# Patient Record
Sex: FEMALE | Race: WHITE | HISPANIC OR LATINO | Employment: UNEMPLOYED | URBAN - METROPOLITAN AREA
[De-identification: names, ages, dates, MRNs, and addresses within clinical notes are randomized per-mention and may not be internally consistent; named-entity substitution may affect disease eponyms.]

---
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
## 2022-03-27 ENCOUNTER — NURSE TRIAGE (OUTPATIENT)
Dept: NURSING | Facility: CLINIC | Age: 22
End: 2022-03-27
Payer: COMMERCIAL

## 2022-03-27 ENCOUNTER — APPOINTMENT (OUTPATIENT)
Dept: CT IMAGING | Facility: HOSPITAL | Age: 22
End: 2022-03-27
Attending: EMERGENCY MEDICINE
Payer: MEDICAID

## 2022-03-27 ENCOUNTER — HOSPITAL ENCOUNTER (EMERGENCY)
Facility: HOSPITAL | Age: 22
Discharge: HOME | End: 2022-03-27
Attending: EMERGENCY MEDICINE | Admitting: EMERGENCY MEDICINE
Payer: MEDICAID

## 2022-03-27 VITALS
OXYGEN SATURATION: 98 % | RESPIRATION RATE: 16 BRPM | TEMPERATURE: 97.9 F | BODY MASS INDEX: 30.6 KG/M2 | WEIGHT: 189.6 LBS | SYSTOLIC BLOOD PRESSURE: 111 MMHG | HEART RATE: 106 BPM | DIASTOLIC BLOOD PRESSURE: 61 MMHG

## 2022-03-27 DIAGNOSIS — R51.9 RIGHT-SIDED HEADACHE: ICD-10-CM

## 2022-03-27 DIAGNOSIS — M25.562 ACUTE PAIN OF LEFT KNEE: ICD-10-CM

## 2022-03-27 LAB
HCG UR QL: NEGATIVE
INTERNAL QC OK POCT: NORMAL
POCT KIT EXPIRATION DATE: NORMAL
POCT KIT LOT NUMBER: NORMAL

## 2022-03-27 PROCEDURE — 96361 HYDRATE IV INFUSION ADD-ON: CPT

## 2022-03-27 PROCEDURE — 70450 CT HEAD/BRAIN W/O DYE: CPT

## 2022-03-27 PROCEDURE — 96375 TX/PRO/DX INJ NEW DRUG ADDON: CPT

## 2022-03-27 PROCEDURE — 258N000003 HC RX IP 258 OP 636: Performed by: EMERGENCY MEDICINE

## 2022-03-27 PROCEDURE — 81025 URINE PREGNANCY TEST: CPT | Performed by: EMERGENCY MEDICINE

## 2022-03-27 PROCEDURE — 250N000011 HC RX IP 250 OP 636: Performed by: EMERGENCY MEDICINE

## 2022-03-27 PROCEDURE — 96374 THER/PROPH/DIAG INJ IV PUSH: CPT

## 2022-03-27 PROCEDURE — 99284 EMERGENCY DEPT VISIT MOD MDM: CPT | Mod: 25

## 2022-03-27 PROCEDURE — 250N000013 HC RX MED GY IP 250 OP 250 PS 637: Performed by: EMERGENCY MEDICINE

## 2022-03-27 RX ORDER — DEXAMETHASONE SODIUM PHOSPHATE 10 MG/ML
10 INJECTION, SOLUTION INTRAMUSCULAR; INTRAVENOUS ONCE
Status: COMPLETED | OUTPATIENT
Start: 2022-03-27 | End: 2022-03-27

## 2022-03-27 RX ORDER — ACETAMINOPHEN 325 MG/1
650 TABLET ORAL ONCE
Status: COMPLETED | OUTPATIENT
Start: 2022-03-27 | End: 2022-03-27

## 2022-03-27 RX ORDER — METOCLOPRAMIDE HYDROCHLORIDE 5 MG/ML
10 INJECTION INTRAMUSCULAR; INTRAVENOUS ONCE
Status: COMPLETED | OUTPATIENT
Start: 2022-03-27 | End: 2022-03-27

## 2022-03-27 RX ORDER — KETOROLAC TROMETHAMINE 30 MG/ML
15 INJECTION, SOLUTION INTRAMUSCULAR; INTRAVENOUS ONCE
Status: COMPLETED | OUTPATIENT
Start: 2022-03-27 | End: 2022-03-27

## 2022-03-27 RX ORDER — DIPHENHYDRAMINE HYDROCHLORIDE 50 MG/ML
25 INJECTION INTRAMUSCULAR; INTRAVENOUS ONCE
Status: COMPLETED | OUTPATIENT
Start: 2022-03-27 | End: 2022-03-27

## 2022-03-27 RX ORDER — DEXAMETHASONE SODIUM PHOSPHATE 10 MG/ML
8 INJECTION, SOLUTION INTRAMUSCULAR; INTRAVENOUS ONCE
Status: DISCONTINUED | OUTPATIENT
Start: 2022-03-27 | End: 2022-03-27

## 2022-03-27 RX ADMIN — KETOROLAC TROMETHAMINE 15 MG: 30 INJECTION, SOLUTION INTRAMUSCULAR at 17:04

## 2022-03-27 RX ADMIN — SODIUM CHLORIDE 1000 ML: 9 INJECTION, SOLUTION INTRAVENOUS at 16:59

## 2022-03-27 RX ADMIN — DIPHENHYDRAMINE HYDROCHLORIDE 25 MG: 50 INJECTION, SOLUTION INTRAMUSCULAR; INTRAVENOUS at 17:08

## 2022-03-27 RX ADMIN — ACETAMINOPHEN 650 MG: 325 TABLET ORAL at 18:20

## 2022-03-27 RX ADMIN — DEXAMETHASONE SODIUM PHOSPHATE 10 MG: 10 INJECTION INTRAMUSCULAR; INTRAVENOUS at 18:18

## 2022-03-27 RX ADMIN — METOCLOPRAMIDE HYDROCHLORIDE 10 MG: 5 INJECTION INTRAMUSCULAR; INTRAVENOUS at 18:00

## 2022-03-27 ASSESSMENT — ENCOUNTER SYMPTOMS
HEADACHES: 1
NAUSEA: 1
DIZZINESS: 1

## 2022-03-27 NOTE — ED PROVIDER NOTES
EMERGENCY DEPARTMENT ENCOUNTER      NAME: Laura Barrera  AGE: 21 year old female  YOB: 2000  MRN: [id number]  EVALUATION DATE & TIME: No admission date for patient encounter.    PCP: No Ref-Primary, Physician    ED PROVIDER: Noe Claire M.D.      Chief Complaint   Patient presents with     Headache         FINAL IMPRESSION:  No diagnosis found.      ED COURSE & MEDICAL DECISION MAKING:    Pertinent Labs & Imaging studies reviewed. (See chart for details)  ED Course as of 04/20/22 1400   Sun Mar 27, 2022   1432 Patient is a 21-year-old woman who presents with right-sided headaches that become more persistent and daily for the past month.  She has blurred vision in the right eye as well as dizziness during these episodes.  She has been seen in the ER couple of times this past week with temporary improvement with Toradol.  On exam here she has 9 out of 10 pressure pain in the right side of her head, pattern is 100% typical for either tension or migraine, but has components of both.  Because her pain pattern worsening pain get a CT of the head.  I would prefer an MRI to rule out demyelinating lesion, however she has implanted metal in her body and not MRI compatible.  If CT scan is negative then we will have her follow-up with neurology as an outpatient.  In the meantime give IV migraine cocktail.   1502 CT scan and IV analgesia are still pending.  Patient is in the waiting room awaiting a bed.  Patient's care will be signed out to the oncoming ED physician Dr. Burt.  If CT scan is unremarkable, plan to discharge patient.  I have already set up the discharge paperwork with primary care information, an urgent referral was also placed.  I also gave her contact information for the neurology clinic and a return to work note for tomorrow.         Additional ED Course Timestamps:  2:15 PM Initial history and physical performed. Plan of care discussed. PPE utilized includes N95 mask, face shield, and  gloves.     At the conclusion of the encounter I discussed the results of all of the tests and the disposition. The questions were answered. The patient or family acknowledged understanding and was agreeable with the care plan.         MEDICATIONS GIVEN IN THE EMERGENCY:  Medications   0.9% sodium chloride BOLUS (has no administration in time range)   metoclopramide (REGLAN) injection 10 mg (has no administration in time range)   diphenhydrAMINE (BENADRYL) injection 25 mg (has no administration in time range)   ketorolac (TORADOL) injection 15 mg (has no administration in time range)         NEW PRESCRIPTIONS STARTED AT TODAY'S ER VISIT  New Prescriptions    No medications on file          =================================================================    HPI    Patient information was obtained from: Patient     Use of : N/A       Laura Barrera is a 21 year old female with no pertinent history who presents to this ED for evaluation of headaches.    Patient reports that she has been having intermittent right sided migraine headaches for about 4 weeks. Patient says that in the past week the frequency of the migraines has increased and she has gotten them every day. Patient describes her current as a 9/10 constant head pressure. Patient endorses associated blurry vision, dizziness, and nausea. Patient reports that she sometimes needs assistance to ambulate because of her dizziness and blurred vision. Patient is unsure of specific triggers but reports that this morning after waking up she was looking out the window. Patient noticed it was bright and then her temple felt like it was getting hot and then there was a shooting right sided head pain. Patient has tried taking ibuprofen and tylenol with no relief. Patient has a cyst on the right side of her head that fluctuates in size. The patient is concerned this cyst could be causing her headaches and would like to have some imaging done. Patient denies chance  of pregnancy or any other concerns at this time.          REVIEW OF SYSTEMS   Review of Systems   Eyes: Positive for visual disturbance.   Gastrointestinal: Positive for nausea.   Neurological: Positive for dizziness and headaches.   All other systems reviewed and are negative.       PAST MEDICAL HISTORY:  History reviewed. No pertinent past medical history.    PAST SURGICAL HISTORY:  History reviewed. No pertinent surgical history.        CURRENT MEDICATIONS:    Current Facility-Administered Medications   Medication     0.9% sodium chloride BOLUS     diphenhydrAMINE (BENADRYL) injection 25 mg     ketorolac (TORADOL) injection 15 mg     metoclopramide (REGLAN) injection 10 mg     Current Outpatient Medications   Medication     order for DME       ALLERGIES:  No Known Allergies    FAMILY HISTORY:  Family History   Problem Relation Age of Onset     Cancer Mother        SOCIAL HISTORY:   Social History     Socioeconomic History     Marital status: Single     Spouse name: None     Number of children: None     Years of education: None     Highest education level: None   Occupational History     None   Tobacco Use     Smoking status: Never Smoker     Smokeless tobacco: Never Used   Substance and Sexual Activity     Alcohol use: None     Drug use: None     Sexual activity: None   Other Topics Concern     None   Social History Narrative     None     Social Determinants of Health     Financial Resource Strain: Not on file   Food Insecurity: Not on file   Transportation Needs: Not on file   Physical Activity: Not on file   Stress: Not on file   Social Connections: Not on file   Intimate Partner Violence: Not on file   Housing Stability: Not on file       VITALS:  /76   Pulse 96   Temp 97.9  F (36.6  C) (Temporal)   Resp 12   Wt 86 kg (189 lb 9.5 oz)   SpO2 97%   BMI 30.60 kg/m      PHYSICAL EXAM    Constitutional: Well developed, well nourished. Comfortable appearing.  HENT: Normocephalic, atraumatic, mucous  membranes moist, nose normal. Neck- Supple, gross ROM intact. Small subcutaneous cyst to right parietal scalp that is tender to touch.  Eyes: Pupils mid-range, conjunctiva without injection, no discharge.   Respiratory: Clear to auscultation bilaterally, no respiratory distress, no wheezing, speaks full sentences easily. No cough.  Cardiovascular: Normal heart rate, regular rhythm, no murmurs. No pedal edema, 2+ DP pulses.   GI: Soft, no tenderness to deep palpation in all quadrants, no masses.  Musculoskeletal: Moving all 4 extremities intentionally and without pain. No obvious deformity.  Skin: Warm, dry, no rash.  Neurologic: Alert & oriented x 3, cranial nerves grossly intact.  Psychiatric: Affect normal, cooperative.       LAB:  All pertinent labs reviewed and interpreted.  Labs Ordered and Resulted from Time of ED Arrival to Time of ED Departure - No data to display    RADIOLOGY:  Reviewed all pertinent imaging. Please see official radiology report.  MR Brain w/o & w Contrast    (Results Pending)       EKG:    All EKG interpretations will be found in ED course above.    PROCEDURES:   none      I, Josh Browne am serving as a scribe to document services personally performed by Dr. Noe Claire based on my observation and the provider's statements to me. INoe MD attest that Josh Browne is acting in a scribe capacity, has observed my performance of the services and has documented them in accordance with my direction.    Noe Claire M.D.  Emergency Medicine  [facility] EMERGENCY DEPARTMENT  [facility] [facility] [id number] [phone]  Dept: [phone]      Noe Claire MD  04/20/22 1670

## 2022-03-27 NOTE — Clinical Note
Laura Barrera was seen and treated in our emergency department on 3/27/2022.  She may return to work on 03/28/2022.       If you have any questions or concerns, please don't hesitate to call.      Noe Claire MD

## 2022-03-27 NOTE — TELEPHONE ENCOUNTER
"Patient's brother calling concerned that his sister has had bump on R side of forehead and is having severe migraines. Pt is not with the caller, so asked to do a 3-way call with pt. Once Laura was on the phone, proceeded with questions.    She says she has had the bump on her head since 8th grade. Does not ever recall any injury to her head. It will intermittently swell and cause severe headaches. She states that the bump itself hurts, too. Currently it is the size of a BB. Has been seen in an C (last on 3/22) for the headaches/pain and was given toradol. Has been missing work due to pain. Also has had light sensitivity. Rates pain in R eye 6/10, Headache pain now 9/10. Last took tylenol at 11pm yesterday evening.   Protocol recommends going to the ED now. Care advice reviewed with the pt to take tylenol and motrin for the pain. Pt verbalized understanding.    Kiersten Wu RN, BSN  [facility]   Triage Nurse Advisor      Reason for Disposition    [1] SEVERE headache (e.g., excruciating) AND [2] \"worst headache\" of life    Additional Information    Negative: Difficult to awaken or acting confused (e.g., disoriented, slurred speech)    Negative: [1] Weakness of the face, arm or leg on one side of the body AND [2] new onset    Negative: [1] Numbness of the face, arm or leg on one side of the body AND [2] new onset    Negative: [1] Loss of speech or garbled speech AND [2] new onset    Negative: Passed out (i.e., lost consciousness, collapsed and was not responding)    Negative: Sounds like a life-threatening emergency to the triager    Negative: Followed a head injury    Negative: Pregnant    Negative: Postpartum (from 0 to 6 weeks after delivery)    Negative: Traumatic Brain Injury (TBI) is suspected    Negative: Unable to walk, or can only walk with assistance (e.g., requires support)    Negative: Stiff neck (can't touch chin to chest)    Negative: Severe pain in one eye    Negative: [1] Other family members " (or roommates) with headaches AND [2] possibility of carbon monoxide exposure    Protocols used: HEADACHE-A-AH

## 2022-03-27 NOTE — DISCHARGE INSTRUCTIONS
Thankfully the CT scan did not show any dangerous signs of tumor, mass, or bleeding.  Because you have frequent headaches that are disabling it is important that you call neurology on Monday to set up an appointment.  I also placed a referral to set up primary care.  Do not get a call on Monday I recommend calling [facility], they can usually get people seen by the end of the week.  Can help with prescription medications to help at home preventing headaches and treating headaches as they arise

## 2022-03-27 NOTE — ED PROVIDER NOTES
EMERGENCY DEPARTMENT ENCOUNTER      NAME: Laura Barrera  AGE: 21 year old female  YOB: 2000  MRN: [id number]  EVALUATION DATE & TIME: No admission date for patient encounter.    PCP: No Ref-Primary, Physician    ED PROVIDER: Flor Burt M.D.        Chief Complaint   Patient presents with     Headache         FINAL IMPRESSION:    1. Right-sided headache    2. Acute pain of left knee          Patient was signed out to me at change of shift by Dr. Claire at 3:33 PM. Please see their note for full HPI, exam and plan.    MEDICAL DECISION MAKIN year old female presents emergency department intermittent headaches now for about 4 weeks.  Head CT negative.  Patient signed out at change of shift awaiting head CT results and response to medications.  Ultimately patient was treated with IV fluids, Benadryl, and Toradol without much relief.  Tylenol, Decadron, Reglan, and then oxygen therapy 5 L nasal cannula were added and patient experienced complete resolution of headache.  She will be discharged home with .      ED COURSE:  3:33 PM  Patient was signed out to me at change of shift by Dr. Claire. Please see their note for full HPI, exam and plan.    5:00PM  Brifely introduced myself to patient and asked for pt to go to CT before meds so that once meds given she can just rest in a dark and quiet room. She understands.    6:03 PM  Recheck on patient.  Headache is now down to a 9/10.  Neuro exam grossly unremarkable.  Head CT negative.  She has received the Toradol, IV fluids, and Benadryl but had not yet received the Reglan.  Nursing is giving it now.  We will add on some Decadron, Tylenol, and oxygen therapy to see if this helps as well.  No red flags on history or exam.    7:30 PM  Rechecked on patient. She reports that her headache is now a 0/10 and says she is ready to go home.  She feels like the oxygen therapy helps the most when she was placed on 5 L nasal cannula oxygen.   She was not requiring oxygen from a respiratory standpoint, this was refused to help with the headache and she thinks this was the most helpful.  Headache resolved almost immediately after this was placed.  She will be discharged home with .    At this time I do not think that the headache represents CVA, TIA, SAH, glaucoma, temporal arteritis, sinus thrombosis, vascular dissection, pseudotumor cerebri, meningitis, enchephalitis, hypertensive urgency or emergency, or other such etiologies.      COVID-19 PPE worn during patient evaluation:  Mask: n95 and homemade masks   Eye Protection: goggles   Gown: none  Hair cover: yes  Face shield: none  Patient wearing a mask: yes      At the conclusion of the encounter I discussed the results of all of the tests and the disposition. Their questions were answered. The patient (and any family present) acknowledged understanding and were agreeable with the care plan.      CONSULTANTS:  None        MEDICATIONS GIVEN IN THE EMERGENCY:  Medications   0.9% sodium chloride BOLUS (0 mLs Intravenous Stopped 3/27/22 1800)   metoclopramide (REGLAN) injection 10 mg (10 mg Intravenous Given 3/27/22 1800)   diphenhydrAMINE (BENADRYL) injection 25 mg (25 mg Intravenous Given 3/27/22 1708)   ketorolac (TORADOL) injection 15 mg (15 mg Intravenous Given 3/27/22 1704)   acetaminophen (TYLENOL) tablet 650 mg (650 mg Oral Given 3/27/22 1820)   dexamethasone PF (DECADRON) injection 10 mg (10 mg Intravenous Given 3/27/22 1818)         NEW PRESCRIPTIONS STARTED AT TODAY'S ER VISIT     Medication List      There are no discharge medications for this visit.           CONDITION:  Stable, improved        DISPOSITION:  discharge home         =================================================================  =================================================================      Patient was signed out to me at change of shift by Dr. Claire at 3:33 PM. Please see their note for full HPI, exam and  plan.        HPI    Laura Barrera is a 21 year old female presents to the ER with complaints of intermittent right-sided migraine type headache now for about 4 weeks.  Frequency has been increasing and she is getting them nearly every day.  She also endorses some blurry vision, dizziness, and some nausea.  Seen by my partner and CT head ordered to be sure there is no new mass or etiology and medications for symptom control.  Ultimate plan is to follow-up with neurology and establish care with primary care if CT scan is negative and headache under control.      PAST MEDICAL HISTORY:  Past Medical History:   Diagnosis Date     Scoliosis          PAST SURGICAL HISTORY:  Past Surgical History:   Procedure Laterality Date     CHOLECYSTOSTOMY  2017     SPINE SURGERY  2013    hx scoliosis         CURRENT MEDICATIONS:    Prior to Admission medications    Medication Sig Start Date End Date Taking? Authorizing Provider   order for DME Equipment being ordered: dorothea xie LT, MD  Patient not taking: Reported on 11/9/2019 7/26/19   Yeo, Albert, MD         ALLERGIES:  No Known Allergies      FAMILY HISTORY:  Family History   Problem Relation Age of Onset     Cancer Mother          SOCIAL HISTORY:  Social History     Socioeconomic History     Marital status: Single     Spouse name: None     Number of children: None     Years of education: None     Highest education level: None   Occupational History     None   Tobacco Use     Smoking status: Never Smoker     Smokeless tobacco: Never Used   Substance and Sexual Activity     Alcohol use: None     Drug use: None     Sexual activity: None   Other Topics Concern     None   Social History Narrative     None     Social Determinants of Health     Financial Resource Strain: Not on file   Food Insecurity: Not on file   Transportation Needs: Not on file   Physical Activity: Not on file   Stress: Not on file   Social Connections: Not on file   Intimate Partner Violence: Not on file    Housing Stability: Not on file         VITALS:  Patient Vitals for the past 24 hrs:   BP Temp Temp src Pulse Resp SpO2 Weight   03/27/22 1930 113/67 -- -- 106 -- 99 % --   03/27/22 1915 107/59 -- -- 85 -- 99 % --   03/27/22 1900 104/56 -- -- 81 -- 100 % --   03/27/22 1845 115/57 -- -- 80 -- 100 % --   03/27/22 1844 -- -- -- 81 -- 100 % --   03/27/22 1830 121/62 -- -- 82 -- 100 % --   03/27/22 1817 119/67 -- -- 91 16 100 % --   03/27/22 1815 -- -- -- 93 -- 100 % --   03/27/22 1715 118/58 -- -- 78 -- 100 % --   03/27/22 1700 121/60 -- -- 81 -- 99 % --   03/27/22 1400 127/76 97.9  F (36.6  C) Temporal 96 12 97 % 86 kg (189 lb 9.5 oz)       Wt Readings from Last 3 Encounters:   03/27/22 86 kg (189 lb 9.5 oz) (96 %, Z= 1.77)*   11/09/19 80.6 kg (177 lb 9.6 oz) (95 %, Z= 1.66)*   09/16/19 81.2 kg (179 lb) (95 %, Z= 1.69)*     * Growth percentiles are based on CDC (Girls, 2-20 Years) data.         PHYSICAL EXAM    Please see initial providers note for detailed physical exam        LAB:  All pertinent labs reviewed and interpreted.  Recent Results (from the past 24 hour(s))   HCG qualitative urine POCT    Collection Time: 03/27/22  4:27 PM   Result Value Ref Range    HCG Qual Urine Negative Negative    Internal QC Check POCT Valid Valid    POCT Kit Lot Number [id number]     POCT Kit Expiration Date 3-        No results found for: ABORH        RADIOLOGY:  Reviewed all pertinent imaging. Please see official radiology report.    Head CT w/o contrast   Final Result   IMPRESSION:     1.  No evidence of acute intracranial hemorrhage or mass effect.            EKG:    none    PROCEDURES:  none      I, Raisa Fransisco, am serving as a scribe to document services personally performed by Dr. Flor Burt based on my observation and the provider's statements to me. I, Dr. Flor Burt MD attest that Raisa Moody is acting in a scribe capacity, has observed my performance of the services and has documented them in  accordance with my direction.        Flor Burt M.D. [facility]  Emergency Medicine and Medical Toxicology  Formerly [facility] EMERGENCY DEPARTMENT  [facility] [facility] [facility] [id number] [phone]  Dept: [phone]         Flor Burt MD  03/27/22 1940

## 2022-03-27 NOTE — ED TRIAGE NOTES
She started notice a lump on the right side of her head. It has been there for years. But recently she has had headaches she attributes to the lump

## 2022-03-27 NOTE — ED NOTES
"He fibashir states they have gone to a number of providers who they say \"are not willing to look into it any further.\" When I asked what that means he states he and the patient are wanting imaging of her brain. I provided some education that if they can feel the bump on the outside of the bone on the their head there is likely no relation to a brain injury that would require imaging since the lump has been there for years. I advised them the lump and the headache may have nothing to do with each other.  "

## 2022-03-27 NOTE — Clinical Note
Laura Barrera was seen and treated in our emergency department on 3/27/2022.  She may return to work on 03/29/2022.       If you have any questions or concerns, please don't hesitate to call.      Flor Burt MD

## 2022-03-27 NOTE — Clinical Note
Bruce was seen and treated in our emergency department on 3/27/2022.  She may return to school on 03/29/2022.      If you have any questions or concerns, please don't hesitate to call.      Flor Burt MD

## 2022-03-28 ENCOUNTER — TELEPHONE (OUTPATIENT)
Dept: NEUROLOGY | Facility: CLINIC | Age: 22
End: 2022-03-28
Payer: COMMERCIAL

## 2022-03-28 NOTE — TELEPHONE ENCOUNTER
[facility]    Phone Message    May a detailed message be left on voicemail: yes     Reason for Call: Appointment Intake    Referring Provider Name: Flor Josue  Diagnosis and/or Symptoms: Right-sided headache /Headache/Migraine    Per dr. Burt please schedule patient with in 1-2 days, patient is schedule on 7/21/22 with Dr. Us, patient was added to waitlist    Action Taken: Other: [facility]    Travel Screening: Not Applicable

## 2022-03-29 ENCOUNTER — NURSE TRIAGE (OUTPATIENT)
Dept: NURSING | Facility: CLINIC | Age: 22
End: 2022-03-29
Payer: COMMERCIAL

## 2022-03-29 NOTE — TELEPHONE ENCOUNTER
Triage Call:    Patient is calling and for the past 3-4 weeks has been having bad headaches possibly caused by cysts on her head.  A few days ago she was in the ED due to a bad headache due to pain control.  What worked the best she said was getting oxygen via mask and she was on it for about 1.5 hours and the pain was improved with that.  She was told to call the Urgent Care to see if they would be able to do this vs coming into the ED if she needed it again and she reports the pain level is the same and she is wanting that again for pain control.   Advised that the UC will not use O2 as a treatment, as they only use it for emergency uses.      Reviewed chart and provider documented that O2 was effective, along with Reglan and decadron.     Pt was advised of protocol recommendation/disposition of follow-up with ED providers, as patient does not have established PCP within [facility] to address medication/oxygen requested need.     Flor Cameron RN on 3/29/2022 at 5:46 PM        COVID 19 Nurse Triage Plan/Patient Instructions    Please be aware that novel coronavirus (COVID-19) may be circulating in the community. If you develop symptoms such as fever, cough, or SOB or if you have concerns about the presence of another infection including coronavirus (COVID-19), please contact your health care provider or visit [email or web address].     Disposition/Instructions    In-Person Visit with provider recommended. Reference Visit Selection Guide.    Thank you for taking steps to prevent the spread of this virus.  o Limit your contact with others.  o Wear a simple mask to cover your cough.  o Wash your hands well and often.    Resources    [facility]: About COVID-19: [email or web address]    CDC: What to Do If You're Sick: www.cdc.gov/coronavirus/2019-ncov/about/steps-when-sick.html    CDC: Ending Home Isolation: www.cdc.gov/coronavirus/2019-ncov/hcp/disposition-in-home-patients.html     CDC: Caring for  Someone: www.cdc.gov/coronavirus/2019-ncov/if-you-are-sick/care-for-someone.html     [facility]: Interim Guidance for Hospital Discharge to Home: www.health.[facility].[place]/diseases/coronavirus/hcp/hospdischarge.pdf    [facility] clinical trials (COVID-19 research studies): clinicalaffairs.[facility]/umn-clinical-trials     Below are the COVID-19 hotlines at the [place] Department of Health ([facility]). Interpreters are available.   o For health questions: Call 364-979-0863 or 1-630.938.4729 (7 a.m. to 7 p.m.)  o For questions about schools and childcare: Call 424-032-8017 or 1-778.674.3759 (7 a.m. to 7 p.m.)                   Reason for Disposition    [1] Caller has URGENT medication question about med that PCP or specialist prescribed AND [2] triager unable to answer question    Additional Information    Negative: Drug overdose and triager unable to answer question    Negative: Caller requesting information unrelated to medicine    Negative: Caller requesting a prescription for Strep throat and has a positive culture result    Negative: Rash while taking a medication or within 3 days of stopping it    Negative: Immunization reaction suspected    Negative: [1] Asthma and [2] having symptoms of asthma (cough, wheezing, etc.)    Negative: [1] Influenza symptoms AND [2] anti-viral med prescription request, such as Tamiflu    Negative: [1] Symptom of illness (e.g., headache, abdominal pain, earache, vomiting) AND [2] more than mild    Negative: MORE THAN A DOUBLE DOSE of a prescription or over-the-counter (OTC) drug    Negative: [1] DOUBLE DOSE (an extra dose or lesser amount) of over-the-counter (OTC) drug AND [2] any symptoms (e.g., dizziness, nausea, pain, sleepiness)    Negative: [1] DOUBLE DOSE (an extra dose or lesser amount) of prescription drug AND [2] any symptoms (e.g., dizziness, nausea, pain, sleepiness)    Negative: Took another person's prescription drug    Negative: [1] DOUBLE DOSE (an extra dose or lesser  "amount) of prescription drug AND [2] NO symptoms (Exception: a double dose of antibiotics)    Negative: Diabetes drug error or overdose (e.g., took wrong type of insulin or took extra dose)    Negative: [1] Request for URGENT new prescription or refill of \"essential\" medication (i.e., likelihood of harm to patient if not taken) AND [2] triager unable to fill per unit policy    Negative: [1] Prescription not at pharmacy AND [2] was prescribed by PCP recently    Negative: [1] Pharmacy calling with prescription questions AND [2] triager unable to answer question    Protocols used: MEDICATION QUESTION CALL-A-AH      "

## 2022-03-31 ENCOUNTER — OFFICE VISIT (OUTPATIENT)
Dept: NEUROLOGY | Facility: CLINIC | Age: 22
End: 2022-03-31
Payer: MEDICAID

## 2022-03-31 VITALS — DIASTOLIC BLOOD PRESSURE: 106 MMHG | SYSTOLIC BLOOD PRESSURE: 149 MMHG | HEART RATE: 137 BPM

## 2022-03-31 DIAGNOSIS — G43.009 MIGRAINE WITHOUT AURA AND WITHOUT STATUS MIGRAINOSUS, NOT INTRACTABLE: ICD-10-CM

## 2022-03-31 DIAGNOSIS — R51.9 RIGHT-SIDED HEADACHE: ICD-10-CM

## 2022-03-31 PROBLEM — M25.562 ACUTE PAIN OF LEFT KNEE: Status: RESOLVED | Noted: 2019-09-21 | Resolved: 2022-03-31

## 2022-03-31 PROCEDURE — 99205 OFFICE O/P NEW HI 60 MIN: CPT | Performed by: PSYCHIATRY & NEUROLOGY

## 2022-03-31 RX ORDER — RIZATRIPTAN BENZOATE 10 MG/1
10 TABLET, ORALLY DISINTEGRATING ORAL
Qty: 18 TABLET | Refills: 3 | Status: SHIPPED | OUTPATIENT
Start: 2022-03-31 | End: 2022-03-31

## 2022-03-31 RX ORDER — RIZATRIPTAN BENZOATE 10 MG/1
10 TABLET, ORALLY DISINTEGRATING ORAL
Qty: 18 TABLET | Refills: 3 | Status: SHIPPED | OUTPATIENT
Start: 2022-03-31 | End: 2023-04-04

## 2022-03-31 RX ORDER — NORTRIPTYLINE HYDROCHLORIDE 50 MG/1
50 CAPSULE ORAL AT BEDTIME
Qty: 30 CAPSULE | Refills: 6 | Status: SHIPPED | OUTPATIENT
Start: 2022-04-30 | End: 2022-07-26

## 2022-03-31 RX ORDER — NORTRIPTYLINE HCL 25 MG
CAPSULE ORAL
Qty: 60 CAPSULE | Refills: 6 | Status: SHIPPED | OUTPATIENT
Start: 2022-03-31 | End: 2022-03-31

## 2022-03-31 RX ORDER — NORTRIPTYLINE HYDROCHLORIDE 50 MG/1
50 CAPSULE ORAL AT BEDTIME
Qty: 30 CAPSULE | Refills: 6 | Status: SHIPPED | OUTPATIENT
Start: 2022-04-30 | End: 2022-03-31

## 2022-03-31 RX ORDER — NORTRIPTYLINE HCL 25 MG
CAPSULE ORAL
Qty: 60 CAPSULE | Refills: 6 | Status: SHIPPED | OUTPATIENT
Start: 2022-03-31 | End: 2022-07-26

## 2022-03-31 NOTE — LETTER
3/31/2022         RE: Laura Barrera  [id number] [address] [id number]        Dear Colleague,    Thank you for referring your patient, Laura Barrera, to the [facility] [place]. Please see a copy of my visit note below.    NEUROLOGY CONSULTATION NOTE       [facility] [place]  [address] [place] [address]  Tel: [phone]  Fax: [phone]  [email or web address]     Laura Barrera,  2000, MRN [id number]  PCP: No Ref-Primary, Physician  Date: 2022     ASSESSMENT & PLAN     Visit Diagnosis  1. Right-sided headache     Migraine headache without aura  21-year-old female with history of scoliosis status post kiley placement who was referred for evaluation of progressively worsening headaches.  Her description sounds typical for migraine without aura but on funduscopic exam on the left margins are as sharp and I could not appreciate any venous pulsations bilaterally.  I recommended MRI scan but patient needs to find out from her surgeon if her rods are compatible with MRI and in the meantime I have started her on nortriptyline 25 mg at bedtime increasing it to 50 mg at bedtime.  For abortive treatment she will use Maxalt MLT.  She will call us once she finds out if her metal rods are compatible with MRI to schedule her for a brain MRI with and without contrast.  Follow-up will be in 3 months.    Thank you again for this referral, please feel free to contact me if you have any questions.    Dhaval Us MD  [facility]  ([facility], [facility]     REASON FOR CONSULTATION Headache        HISTORY OF PRESENT ILLNESS     We have been requested by Dr. Burt to evaluate Laura Barrera who is a 21 year old  female for headaches    Patient is a 21-year-old female with history of scoliosis status post kiley placement who was referred for evaluation of progressively worsening headaches.   According to the patient in the last 1 year she has noticed progressively worsening headaches.  These headaches are preceded by a feeling of dizziness followed by right-sided headaches.  She has photophobia and phonophobia and occasionally notices some nausea.  She has noticed during these headaches she has some blurred vision mostly in her peripheral field of vision.  These headaches progressively are getting more frequent and she is getting 10-12 headaches in a week.  She is taking ibuprofen and that did not seem to help.  Headache got worse and was seen in the emergency room and had a CT of the head that was unremarkable and was started on Benadryl and Tylenol and told to follow-up with neurology.  She denies any focal weakness.  She has family history of migraine     PROBLEM LIST   Patient Active Problem List   Diagnosis Code     Migraine without aura and without status migrainosus, not intractable G43.009         PAST MEDICAL & SURGICAL HISTORY     Past Medical History:   Patient  has a past medical history of Scoliosis.    Surgical History:  She  has a past surgical history that includes Spine surgery (2013) and Cholecystostomy (2017).     SOCIAL HISTORY     Reviewed, and she  reports that she has never smoked. She has never used smokeless tobacco.     FAMILY HISTORY     Reviewed, and family history includes Cancer in her mother.     ALLERGIES     No Known Allergies      REVIEW OF SYSTEMS     A 12 point review of system was performed and was negative except as outlined in the history of present illness.     HOME MEDICATIONS     Current Outpatient Rx   Medication Sig Dispense Refill     nortriptyline (PAMELOR) 25 MG capsule 1 PO at bedtime x 1 week then 2 PO QHS 60 capsule 6     [START ON 4/30/2022] nortriptyline (PAMELOR) 50 MG capsule Take 1 capsule (50 mg) by mouth At Bedtime 30 capsule 6     rizatriptan (MAXALT-MLT) 10 MG ODT Take 1 tablet (10 mg) by mouth at onset of headache for migraine May repeat in 2  hours. Max 3 tablets/24 hours. 18 tablet 3         PHYSICAL EXAM     Vital signs  BP (!) 149/106 (BP Location: Right arm, Patient Position: Sitting)   Pulse (!) 137     Weight:   0 lbs 0 oz    GENERAL PHYSICAL EXAM: Patient is alert and oriented x 4 in no acute distress. Neck was supple, no carotid bruits, thyromegaly, lymphadenopathy or JVD noted.   NEUROLOGICAL EXAM:  Mental Status  Patient is A&O x 4. Patient recalls 3/3 objects at 5 minutes.  Speech  Speech is clear and fluent with good repetition, comprehension, and naming both for objects and parts of an object. Written and verbal comprehension is intact.  Cranial Nerves  CN II: Visual fields are full to confrontation. Fundoscopic exam is normal with sharp discs and no vascular changes on the right, on the left margins appear somewhat blurred. Venous pulsations were not observed. Pupils are equal and reactive to light.   CN III, IV, VI: EOMI, PERRLA  CN V: Facial sensation is intact to pinprick in all 3 divisions bilaterally. Corneal responses are intact.  CN VII: Face is symmetric with normal eye closure and smile.  CN VII: Hearing is normal to rubbing fingers  CN IX, X: Palate elevates symmetrically. Phonation is normal.  CN XI: Head turning and shoulder shrug are intact  CN XII: Tongue is midline with normal movements and no atrophy.  Motor Exam  Muscle bulk and tone are normal. No pronator drift. Strength is 5/5 bilaterally. No fasciculations noted.  Reflexes  Reflexes are 2+ and symmetric at the biceps, triceps, knees, and ankles. Plantar responses are flexor.  Sensory Exam  Light touch, pinprick, position sense, and vibration sense are intact bilaterally. No astereognosia, agraphesthesia or extinction to bilateral simultaneous stimulation.  Coordination  Rapid alternating movements and fine finger movements are intact. No dysmetria on FNF and HKS. Romberg negative.  Gait  Posture is normal.Tandem gait is normal. Able to walk on toes and heels.      PERTINENT DIAGNOSTIC STUDIES     Following studies were reviewed:     CT BRAIN 3/27/2022  1.  No evidence of acute intracranial hemorrhage or mass effect.     PERTINENT LABS  Following labs were reviewed:  Admission on 03/27/2022, Discharged on 03/27/2022   Component Date Value     HCG Qual Urine 03/27/2022 Negative      Internal QC Check POCT 03/27/2022 Valid      POCT Kit Lot Number 03/27/2022 [id number]      POCT Kit Expiration Date 03/27/2022 3-         Total time spent for face to face visit, reviewing labs/imaging studies, counseling and coordination of care was: 1 Hour spent on the date of the encounter doing chart review, review of outside records, review of test results, interpretation of tests, patient visit and documentation       This note was dictated using voice recognition software.  Any grammatical or context distortions are unintentional and inherent to the software.    No orders of the defined types were placed in this encounter.     New Prescriptions    NORTRIPTYLINE (PAMELOR) 25 MG CAPSULE    1 PO at bedtime x 1 week then 2 PO QHS    NORTRIPTYLINE (PAMELOR) 50 MG CAPSULE    Take 1 capsule (50 mg) by mouth At Bedtime    RIZATRIPTAN (MAXALT-MLT) 10 MG ODT    Take 1 tablet (10 mg) by mouth at onset of headache for migraine May repeat in 2 hours. Max 3 tablets/24 hours.      Modified Medications    No medications on file              Again, thank you for allowing me to participate in the care of your patient.        Sincerely,        Dhaval Us MD

## 2022-03-31 NOTE — NURSING NOTE
Chief Complaint   Patient presents with     Headache     Idania Cabrera RN on 3/31/2022 at 2:30 PM

## 2022-03-31 NOTE — PROGRESS NOTES
NEUROLOGY CONSULTATION NOTE       [facility] NEUROLOGY [place]  [address] [place] [address]  Tel: [phone]  Fax: [phone]  [email or web address]     Laura Barrera,  2000, MRN [id number]  PCP: No Ref-Primary, Physician  Date: 2022     ASSESSMENT & PLAN     Visit Diagnosis  1. Right-sided headache     Migraine headache without aura  21-year-old female with history of scoliosis status post kiley placement who was referred for evaluation of progressively worsening headaches.  Her description sounds typical for migraine without aura but on funduscopic exam on the left margins are as sharp and I could not appreciate any venous pulsations bilaterally.  I recommended MRI scan but patient needs to find out from her surgeon if her rods are compatible with MRI and in the meantime I have started her on nortriptyline 25 mg at bedtime increasing it to 50 mg at bedtime.  For abortive treatment she will use Maxalt MLT.  She will call us once she finds out if her metal rods are compatible with MRI to schedule her for a brain MRI with and without contrast.  Follow-up will be in 3 months.    Thank you again for this referral, please feel free to contact me if you have any questions.    Dhaval Us MD  [facility]  (Former[facility], [facility]     REASON FOR CONSULTATION Headache        HISTORY OF PRESENT ILLNESS     We have been requested by Dr. Burt to evaluate Laura Barrera who is a 21 year old  female for headaches    Patient is a 21-year-old female with history of scoliosis status post kiley placement who was referred for evaluation of progressively worsening headaches.  According to the patient in the last 1 year she has noticed progressively worsening headaches.  These headaches are preceded by a feeling of dizziness followed by right-sided headaches.  She has photophobia and phonophobia and occasionally notices some nausea.  She  has noticed during these headaches she has some blurred vision mostly in her peripheral field of vision.  These headaches progressively are getting more frequent and she is getting 10-12 headaches in a week.  She is taking ibuprofen and that did not seem to help.  Headache got worse and was seen in the emergency room and had a CT of the head that was unremarkable and was started on Benadryl and Tylenol and told to follow-up with neurology.  She denies any focal weakness.  She has family history of migraine     PROBLEM LIST   Patient Active Problem List   Diagnosis Code     Migraine without aura and without status migrainosus, not intractable G43.009         PAST MEDICAL & SURGICAL HISTORY     Past Medical History:   Patient  has a past medical history of Scoliosis.    Surgical History:  She  has a past surgical history that includes Spine surgery (2013) and Cholecystostomy (2017).     SOCIAL HISTORY     Reviewed, and she  reports that she has never smoked. She has never used smokeless tobacco.     FAMILY HISTORY     Reviewed, and family history includes Cancer in her mother.     ALLERGIES     No Known Allergies      REVIEW OF SYSTEMS     A 12 point review of system was performed and was negative except as outlined in the history of present illness.     HOME MEDICATIONS     Current Outpatient Rx   Medication Sig Dispense Refill     nortriptyline (PAMELOR) 25 MG capsule 1 PO at bedtime x 1 week then 2 PO QHS 60 capsule 6     [START ON 4/30/2022] nortriptyline (PAMELOR) 50 MG capsule Take 1 capsule (50 mg) by mouth At Bedtime 30 capsule 6     rizatriptan (MAXALT-MLT) 10 MG ODT Take 1 tablet (10 mg) by mouth at onset of headache for migraine May repeat in 2 hours. Max 3 tablets/24 hours. 18 tablet 3         PHYSICAL EXAM     Vital signs  BP (!) 149/106 (BP Location: Right arm, Patient Position: Sitting)   Pulse (!) 137     Weight:   0 lbs 0 oz    GENERAL PHYSICAL EXAM: Patient is alert and oriented x 4 in no acute  distress. Neck was supple, no carotid bruits, thyromegaly, lymphadenopathy or JVD noted.   NEUROLOGICAL EXAM:  Mental Status  Patient is A&O x 4. Patient recalls 3/3 objects at 5 minutes.  Speech  Speech is clear and fluent with good repetition, comprehension, and naming both for objects and parts of an object. Written and verbal comprehension is intact.  Cranial Nerves  CN II: Visual fields are full to confrontation. Fundoscopic exam is normal with sharp discs and no vascular changes on the right, on the left margins appear somewhat blurred. Venous pulsations were not observed. Pupils are equal and reactive to light.   CN III, IV, VI: EOMI, PERRLA  CN V: Facial sensation is intact to pinprick in all 3 divisions bilaterally. Corneal responses are intact.  CN VII: Face is symmetric with normal eye closure and smile.  CN VII: Hearing is normal to rubbing fingers  CN IX, X: Palate elevates symmetrically. Phonation is normal.  CN XI: Head turning and shoulder shrug are intact  CN XII: Tongue is midline with normal movements and no atrophy.  Motor Exam  Muscle bulk and tone are normal. No pronator drift. Strength is 5/5 bilaterally. No fasciculations noted.  Reflexes  Reflexes are 2+ and symmetric at the biceps, triceps, knees, and ankles. Plantar responses are flexor.  Sensory Exam  Light touch, pinprick, position sense, and vibration sense are intact bilaterally. No astereognosia, agraphesthesia or extinction to bilateral simultaneous stimulation.  Coordination  Rapid alternating movements and fine finger movements are intact. No dysmetria on FNF and HKS. Romberg negative.  Gait  Posture is normal.Tandem gait is normal. Able to walk on toes and heels.     PERTINENT DIAGNOSTIC STUDIES     Following studies were reviewed:     CT BRAIN 3/27/2022  1.  No evidence of acute intracranial hemorrhage or mass effect.     PERTINENT LABS  Following labs were reviewed:  Admission on 03/27/2022, Discharged on 03/27/2022   Component  Date Value     HCG Qual Urine 03/27/2022 Negative      Internal QC Check POCT 03/27/2022 Valid      POCT Kit Lot Number 03/27/2022 [id number]      POCT Kit Expiration Date 03/27/2022 3-         Total time spent for face to face visit, reviewing labs/imaging studies, counseling and coordination of care was: 1 Hour spent on the date of the encounter doing chart review, review of outside records, review of test results, interpretation of tests, patient visit and documentation       This note was dictated using voice recognition software.  Any grammatical or context distortions are unintentional and inherent to the software.    No orders of the defined types were placed in this encounter.     New Prescriptions    NORTRIPTYLINE (PAMELOR) 25 MG CAPSULE    1 PO at bedtime x 1 week then 2 PO QHS    NORTRIPTYLINE (PAMELOR) 50 MG CAPSULE    Take 1 capsule (50 mg) by mouth At Bedtime    RIZATRIPTAN (MAXALT-MLT) 10 MG ODT    Take 1 tablet (10 mg) by mouth at onset of headache for migraine May repeat in 2 hours. Max 3 tablets/24 hours.      Modified Medications    No medications on file

## 2022-06-05 ENCOUNTER — HOSPITAL ENCOUNTER (EMERGENCY)
Facility: CLINIC | Age: 22
Discharge: HOME | End: 2022-06-06
Attending: EMERGENCY MEDICINE | Admitting: EMERGENCY MEDICINE
Payer: COMMERCIAL

## 2022-06-05 DIAGNOSIS — R07.89 RIGHT-SIDED CHEST WALL PAIN: ICD-10-CM

## 2022-06-05 LAB
ANION GAP SERPL CALCULATED.3IONS-SCNC: 8 MMOL/L (ref 3–14)
BASOPHILS # BLD AUTO: 0.1 10E3/UL (ref 0–0.2)
BASOPHILS NFR BLD AUTO: 1 %
BUN SERPL-MCNC: 7 MG/DL (ref 7–30)
CALCIUM SERPL-MCNC: 8.8 MG/DL (ref 8.5–10.1)
CHLORIDE BLD-SCNC: 108 MMOL/L (ref 94–109)
CO2 SERPL-SCNC: 25 MMOL/L (ref 20–32)
CREAT SERPL-MCNC: 0.51 MG/DL (ref 0.52–1.04)
D DIMER PPP FEU-MCNC: 0.62 UG/ML FEU (ref 0–0.5)
EOSINOPHIL # BLD AUTO: 0.2 10E3/UL (ref 0–0.7)
EOSINOPHIL NFR BLD AUTO: 3 %
ERYTHROCYTE [DISTWIDTH] IN BLOOD BY AUTOMATED COUNT: 12.7 % (ref 10–15)
GFR SERPL CREATININE-BSD FRML MDRD: >90 ML/MIN/1.73M2
GLUCOSE BLD-MCNC: 109 MG/DL (ref 70–99)
HCT VFR BLD AUTO: 43.3 % (ref 35–47)
HGB BLD-MCNC: 14.1 G/DL (ref 11.7–15.7)
IMM GRANULOCYTES # BLD: 0.1 10E3/UL
IMM GRANULOCYTES NFR BLD: 1 %
LYMPHOCYTES # BLD AUTO: 3 10E3/UL (ref 0.8–5.3)
LYMPHOCYTES NFR BLD AUTO: 31 %
MCH RBC QN AUTO: 28.7 PG (ref 26.5–33)
MCHC RBC AUTO-ENTMCNC: 32.6 G/DL (ref 31.5–36.5)
MCV RBC AUTO: 88 FL (ref 78–100)
MONOCYTES # BLD AUTO: 0.8 10E3/UL (ref 0–1.3)
MONOCYTES NFR BLD AUTO: 8 %
NEUTROPHILS # BLD AUTO: 5.6 10E3/UL (ref 1.6–8.3)
NEUTROPHILS NFR BLD AUTO: 56 %
NRBC # BLD AUTO: 0 10E3/UL
NRBC BLD AUTO-RTO: 0 /100
PLATELET # BLD AUTO: 327 10E3/UL (ref 150–450)
POTASSIUM BLD-SCNC: 3.6 MMOL/L (ref 3.4–5.3)
RBC # BLD AUTO: 4.91 10E6/UL (ref 3.8–5.2)
SODIUM SERPL-SCNC: 141 MMOL/L (ref 133–144)
TROPONIN I SERPL HS-MCNC: <3 NG/L
WBC # BLD AUTO: 9.7 10E3/UL (ref 4–11)

## 2022-06-05 PROCEDURE — 99285 EMERGENCY DEPT VISIT HI MDM: CPT | Mod: 25

## 2022-06-05 PROCEDURE — 84484 ASSAY OF TROPONIN QUANT: CPT | Performed by: EMERGENCY MEDICINE

## 2022-06-05 PROCEDURE — 80048 BASIC METABOLIC PNL TOTAL CA: CPT | Performed by: EMERGENCY MEDICINE

## 2022-06-05 PROCEDURE — 84703 CHORIONIC GONADOTROPIN ASSAY: CPT | Performed by: EMERGENCY MEDICINE

## 2022-06-05 PROCEDURE — 36415 COLL VENOUS BLD VENIPUNCTURE: CPT | Performed by: EMERGENCY MEDICINE

## 2022-06-05 PROCEDURE — 85004 AUTOMATED DIFF WBC COUNT: CPT | Performed by: EMERGENCY MEDICINE

## 2022-06-05 PROCEDURE — 93005 ELECTROCARDIOGRAM TRACING: CPT

## 2022-06-05 PROCEDURE — 85379 FIBRIN DEGRADATION QUANT: CPT | Performed by: EMERGENCY MEDICINE

## 2022-06-05 ASSESSMENT — ENCOUNTER SYMPTOMS
FEVER: 0
COUGH: 0
SHORTNESS OF BREATH: 1
BACK PAIN: 1
ABDOMINAL PAIN: 1

## 2022-06-06 ENCOUNTER — APPOINTMENT (OUTPATIENT)
Dept: CT IMAGING | Facility: CLINIC | Age: 22
End: 2022-06-06
Attending: EMERGENCY MEDICINE
Payer: COMMERCIAL

## 2022-06-06 VITALS
RESPIRATION RATE: 19 BRPM | DIASTOLIC BLOOD PRESSURE: 75 MMHG | OXYGEN SATURATION: 98 % | SYSTOLIC BLOOD PRESSURE: 116 MMHG | HEART RATE: 103 BPM | TEMPERATURE: 98.2 F

## 2022-06-06 LAB
ATRIAL RATE - MUSE: 101 BPM
DIASTOLIC BLOOD PRESSURE - MUSE: NORMAL MMHG
HCG SERPL QL: NEGATIVE
INTERPRETATION ECG - MUSE: NORMAL
P AXIS - MUSE: 23 DEGREES
PR INTERVAL - MUSE: 128 MS
QRS DURATION - MUSE: 78 MS
QT - MUSE: 338 MS
QTC - MUSE: 438 MS
R AXIS - MUSE: 43 DEGREES
SYSTOLIC BLOOD PRESSURE - MUSE: NORMAL MMHG
T AXIS - MUSE: 15 DEGREES
VENTRICULAR RATE- MUSE: 101 BPM

## 2022-06-06 PROCEDURE — 250N000011 HC RX IP 250 OP 636: Performed by: EMERGENCY MEDICINE

## 2022-06-06 PROCEDURE — 71275 CT ANGIOGRAPHY CHEST: CPT

## 2022-06-06 PROCEDURE — 250N000009 HC RX 250: Performed by: EMERGENCY MEDICINE

## 2022-06-06 RX ORDER — IOPAMIDOL 755 MG/ML
500 INJECTION, SOLUTION INTRAVASCULAR ONCE
Status: COMPLETED | OUTPATIENT
Start: 2022-06-06 | End: 2022-06-06

## 2022-06-06 RX ADMIN — IOPAMIDOL 65 ML: 755 INJECTION, SOLUTION INTRAVENOUS at 00:19

## 2022-06-06 RX ADMIN — SODIUM CHLORIDE 85 ML: 9 INJECTION, SOLUTION INTRAVENOUS at 00:22

## 2022-06-06 NOTE — ED TRIAGE NOTES
Pt c/o dyspnea secondary to scoliosis. Pt states currently being evaluated for surgical correction of scoliosis. ABCs intact GCS 15     Triage Assessment     Row Name 06/05/22 2200       Triage Assessment (Adult)    Airway WDL WDL       Respiratory WDL    Respiratory WDL WDL       Skin Circulation/Temperature WDL    Skin Circulation/Temperature WDL WDL       Cardiac WDL    Cardiac WDL WDL       Peripheral/Neurovascular WDL    Peripheral Neurovascular WDL WDL       Cognitive/Neuro/Behavioral WDL    Cognitive/Neuro/Behavioral WDL WDL

## 2022-06-06 NOTE — ED PROVIDER NOTES
"  History     Chief Complaint:  Shortness of Breath     The history is provided by the patient.      Laura Barrera is a 22 year old female with history of cholecystectomy and scoliosis who presents with a sharp epigastric abdominal pain. She has been experiencing intermittent abdominal pain for the past couple of months, but it has become constant over the past 3 days. She notes the pain radiates to her back, and sometimes makes her short of breath. She has scoliosis and \"it feels like her spine is pushing against her lung\". She has an upcoming appointment in 4 days for a checkup on scoliosis. She denies fever, cough, or rash.     Review of Systems   Constitutional: Negative for fever.   Respiratory: Positive for shortness of breath. Negative for cough.    Gastrointestinal: Positive for abdominal pain.   Musculoskeletal: Positive for back pain.   Skin: Negative for rash.   All other systems reviewed and are negative.    Allergies:  No Known Allergies    Medications:  Zeina Sy      Past Medical History:     Scoliosis     Past Surgical History:    Cholecystectomy   Spinal surgery     Family History:    Mother - cancer     Social History:  Patient presents to the ED with a friend via private vehicle     Physical Exam     Patient Vitals for the past 24 hrs:   BP Temp Temp src Pulse Resp SpO2   06/06/22 0015 116/75 -- -- 103 19 98 %   06/06/22 0000 117/74 -- -- 107 18 97 %   06/05/22 2345 111/70 -- -- 102 17 100 %   06/05/22 2330 122/74 -- -- 107 15 98 %   06/05/22 2159 160/96 98.2  F (36.8  C) Oral 106 20 97 %     Physical Exam  Nursing note and vitals reviewed.  Constitutional: Cooperative.   HENT:   Mouth/Throat: Mucous membranes are normal.   Cardiovascular: Normal rate, regular rhythm and normal heart sounds.  No murmur.  Pulmonary/Chest: Effort normal and breath sounds normal. No respiratory distress. No wheezes. No rales.   Abdominal: Soft. Normal appearance and bowel sounds are normal. No distension. " Tenderness to right inferior costal margin.   Musculoskeletal: No LE edema.  Neurological: Alert. Oriented x4.  Skin: Skin is warm and dry. No rash noted on right chest/flank.   Psychiatric: Normal mood and affect.     Emergency Department Course   ECG:  ECG taken at 2259, ECG read at 2302  Sinus tachycardia    Rate 101 bpm. OR interval 128 ms. QRS duration 78 ms. QT/QTc 338/438 ms. P-R-T axes 23 43 15.     Imaging:  CT Chest Pulmonary Embolism w Contrast  Final Result  IMPRESSION:  1.  Negative for pulmonary embolism.  2.  No evidence of pneumonia, pulmonary edema, or pleural effusion.  Report per radiology    Laboratory:  Labs Ordered and Resulted from Time of ED Arrival to Time of ED Departure   BASIC METABOLIC PANEL - Abnormal       Result Value    Sodium 141      Potassium 3.6      Chloride 108      Carbon Dioxide (CO2) 25      Anion Gap 8      Urea Nitrogen 7      Creatinine 0.51 (*)     Calcium 8.8      Glucose 109 (*)     GFR Estimate >90     D DIMER QUANTITATIVE - Abnormal    D-Dimer Quantitative 0.62 (*)    TROPONIN I - Normal    Troponin I High Sensitivity <3     HCG QUALITATIVE PREGNANCY - Normal    hCG Serum Qualitative Negative     CBC WITH PLATELETS AND DIFFERENTIAL    WBC Count 9.7      RBC Count 4.91      Hemoglobin 14.1      Hematocrit 43.3      MCV 88      MCH 28.7      MCHC 32.6      RDW 12.7      Platelet Count 327      % Neutrophils 56      % Lymphocytes 31      % Monocytes 8      % Eosinophils 3      % Basophils 1      % Immature Granulocytes 1      NRBCs per 100 WBC 0      Absolute Neutrophils 5.6      Absolute Lymphocytes 3.0      Absolute Monocytes 0.8      Absolute Eosinophils 0.2      Absolute Basophils 0.1      Absolute Immature Granulocytes 0.1      Absolute NRBCs 0.0        Reviewed:  I reviewed nursing notes, vitals and past medical history    Assessments:  2324 I obtained history and examined the patient as noted above.   0040 I rechecked the patient and explained findings. I  discussed plan for discharge home.    Disposition:  The patient was discharged to home.     Impression & Plan     Medical Decision Making:  Laura Barrera is a 22 year old female who presents with reproducible pain in her right chest wall; specifically in the inferior costal margin. Work up initiated in triage including a Ddimer which is unfortunately elevated. CT scan was performed which shows no evidence of pulmonary embolism, pleural effusion, pneumonia, or other abnormalities. Her basic labs are reassuring. Cardiac screening is normal. I suspect costochondritis or other chest wall inflammation. I will have her follow up with a regular physician.      Diagnosis:    ICD-10-CM    1. Right-sided chest wall pain  R07.89        Scribe Disclosure:  I, Jessee Christie, am serving as a scribe at 11:22 PM on 6/5/2022 to document services personally performed by Khurram Franco MD based on my observations and the provider's statements to me.        Khurram Franco MD  06/06/22 0141

## 2022-06-24 ENCOUNTER — APPOINTMENT (OUTPATIENT)
Dept: CT IMAGING | Facility: CLINIC | Age: 22
End: 2022-06-24
Attending: EMERGENCY MEDICINE
Payer: COMMERCIAL

## 2022-06-24 ENCOUNTER — HOSPITAL ENCOUNTER (EMERGENCY)
Facility: CLINIC | Age: 22
Discharge: HOME | End: 2022-06-25
Attending: EMERGENCY MEDICINE | Admitting: EMERGENCY MEDICINE
Payer: COMMERCIAL

## 2022-06-24 DIAGNOSIS — R00.0 TACHYCARDIA: ICD-10-CM

## 2022-06-24 DIAGNOSIS — R07.89 OTHER CHEST PAIN: ICD-10-CM

## 2022-06-24 LAB
ALBUMIN SERPL-MCNC: 4 G/DL (ref 3.4–5)
ALP SERPL-CCNC: 146 U/L (ref 40–150)
ALT SERPL W P-5'-P-CCNC: 101 U/L (ref 0–50)
ANION GAP SERPL CALCULATED.3IONS-SCNC: 10 MMOL/L (ref 3–14)
AST SERPL W P-5'-P-CCNC: 35 U/L (ref 0–45)
BASOPHILS # BLD AUTO: 0.1 10E3/UL (ref 0–0.2)
BASOPHILS NFR BLD AUTO: 1 %
BILIRUB SERPL-MCNC: 0.3 MG/DL (ref 0.2–1.3)
BUN SERPL-MCNC: 9 MG/DL (ref 7–30)
CALCIUM SERPL-MCNC: 9.2 MG/DL (ref 8.5–10.1)
CHLORIDE BLD-SCNC: 108 MMOL/L (ref 94–109)
CO2 SERPL-SCNC: 23 MMOL/L (ref 20–32)
CREAT SERPL-MCNC: 0.53 MG/DL (ref 0.52–1.04)
D DIMER PPP FEU-MCNC: 1.79 UG/ML FEU (ref 0–0.5)
EOSINOPHIL # BLD AUTO: 0.2 10E3/UL (ref 0–0.7)
EOSINOPHIL NFR BLD AUTO: 2 %
ERYTHROCYTE [DISTWIDTH] IN BLOOD BY AUTOMATED COUNT: 12.2 % (ref 10–15)
GFR SERPL CREATININE-BSD FRML MDRD: >90 ML/MIN/1.73M2
GLUCOSE BLD-MCNC: 93 MG/DL (ref 70–99)
HCG SER QL IA.RAPID: NEGATIVE
HCT VFR BLD AUTO: 43.6 % (ref 35–47)
HGB BLD-MCNC: 14.2 G/DL (ref 11.7–15.7)
IMM GRANULOCYTES # BLD: 0.1 10E3/UL
IMM GRANULOCYTES NFR BLD: 1 %
INR PPP: 1.07 (ref 0.85–1.15)
LIPASE SERPL-CCNC: 102 U/L (ref 73–393)
LYMPHOCYTES # BLD AUTO: 2 10E3/UL (ref 0.8–5.3)
LYMPHOCYTES NFR BLD AUTO: 18 %
MCH RBC QN AUTO: 28.3 PG (ref 26.5–33)
MCHC RBC AUTO-ENTMCNC: 32.6 G/DL (ref 31.5–36.5)
MCV RBC AUTO: 87 FL (ref 78–100)
MONOCYTES # BLD AUTO: 0.8 10E3/UL (ref 0–1.3)
MONOCYTES NFR BLD AUTO: 7 %
NEUTROPHILS # BLD AUTO: 8 10E3/UL (ref 1.6–8.3)
NEUTROPHILS NFR BLD AUTO: 71 %
NRBC # BLD AUTO: 0 10E3/UL
NRBC BLD AUTO-RTO: 0 /100
PLATELET # BLD AUTO: 358 10E3/UL (ref 150–450)
POTASSIUM BLD-SCNC: 3.7 MMOL/L (ref 3.4–5.3)
PROT SERPL-MCNC: 8 G/DL (ref 6.8–8.8)
RBC # BLD AUTO: 5.01 10E6/UL (ref 3.8–5.2)
SARS-COV-2 RNA RESP QL NAA+PROBE: NEGATIVE
SODIUM SERPL-SCNC: 141 MMOL/L (ref 133–144)
TROPONIN I SERPL HS-MCNC: <3 NG/L
WBC # BLD AUTO: 11.1 10E3/UL (ref 4–11)

## 2022-06-24 PROCEDURE — 93005 ELECTROCARDIOGRAM TRACING: CPT

## 2022-06-24 PROCEDURE — 258N000003 HC RX IP 258 OP 636: Performed by: EMERGENCY MEDICINE

## 2022-06-24 PROCEDURE — 84702 CHORIONIC GONADOTROPIN TEST: CPT

## 2022-06-24 PROCEDURE — 85014 HEMATOCRIT: CPT | Performed by: EMERGENCY MEDICINE

## 2022-06-24 PROCEDURE — 250N000011 HC RX IP 250 OP 636: Performed by: EMERGENCY MEDICINE

## 2022-06-24 PROCEDURE — 80053 COMPREHEN METABOLIC PANEL: CPT | Performed by: EMERGENCY MEDICINE

## 2022-06-24 PROCEDURE — 85379 FIBRIN DEGRADATION QUANT: CPT | Performed by: EMERGENCY MEDICINE

## 2022-06-24 PROCEDURE — 83690 ASSAY OF LIPASE: CPT | Performed by: EMERGENCY MEDICINE

## 2022-06-24 PROCEDURE — 71275 CT ANGIOGRAPHY CHEST: CPT

## 2022-06-24 PROCEDURE — 250N000009 HC RX 250: Performed by: EMERGENCY MEDICINE

## 2022-06-24 PROCEDURE — 84484 ASSAY OF TROPONIN QUANT: CPT | Performed by: EMERGENCY MEDICINE

## 2022-06-24 PROCEDURE — 85610 PROTHROMBIN TIME: CPT | Performed by: EMERGENCY MEDICINE

## 2022-06-24 PROCEDURE — 36415 COLL VENOUS BLD VENIPUNCTURE: CPT | Performed by: EMERGENCY MEDICINE

## 2022-06-24 PROCEDURE — U0005 INFEC AGEN DETEC AMPLI PROBE: HCPCS | Performed by: EMERGENCY MEDICINE

## 2022-06-24 PROCEDURE — C9803 HOPD COVID-19 SPEC COLLECT: HCPCS

## 2022-06-24 PROCEDURE — 96375 TX/PRO/DX INJ NEW DRUG ADDON: CPT

## 2022-06-24 PROCEDURE — 99285 EMERGENCY DEPT VISIT HI MDM: CPT | Mod: CS,25

## 2022-06-24 PROCEDURE — 96361 HYDRATE IV INFUSION ADD-ON: CPT

## 2022-06-24 PROCEDURE — 96374 THER/PROPH/DIAG INJ IV PUSH: CPT | Mod: 59

## 2022-06-24 RX ORDER — ONDANSETRON 2 MG/ML
4 INJECTION INTRAMUSCULAR; INTRAVENOUS ONCE
Status: COMPLETED | OUTPATIENT
Start: 2022-06-24 | End: 2022-06-24

## 2022-06-24 RX ORDER — IOPAMIDOL 755 MG/ML
500 INJECTION, SOLUTION INTRAVASCULAR ONCE
Status: COMPLETED | OUTPATIENT
Start: 2022-06-24 | End: 2022-06-24

## 2022-06-24 RX ORDER — GABAPENTIN 300 MG/1
300 CAPSULE ORAL 3 TIMES DAILY
Qty: 30 CAPSULE | Refills: 0 | Status: SHIPPED | OUTPATIENT
Start: 2022-06-24 | End: 2022-07-26

## 2022-06-24 RX ORDER — ONDANSETRON 4 MG/1
4 TABLET, ORALLY DISINTEGRATING ORAL EVERY 8 HOURS PRN
Qty: 10 TABLET | Refills: 0 | Status: SHIPPED | OUTPATIENT
Start: 2022-06-24 | End: 2022-06-27

## 2022-06-24 RX ORDER — MORPHINE SULFATE 4 MG/ML
4 INJECTION, SOLUTION INTRAMUSCULAR; INTRAVENOUS
Status: DISCONTINUED | OUTPATIENT
Start: 2022-06-24 | End: 2022-06-25 | Stop reason: HOSPADM

## 2022-06-24 RX ORDER — KETOROLAC TROMETHAMINE 15 MG/ML
15 INJECTION, SOLUTION INTRAMUSCULAR; INTRAVENOUS ONCE
Status: COMPLETED | OUTPATIENT
Start: 2022-06-24 | End: 2022-06-24

## 2022-06-24 RX ADMIN — ONDANSETRON 4 MG: 2 INJECTION INTRAMUSCULAR; INTRAVENOUS at 23:07

## 2022-06-24 RX ADMIN — MORPHINE SULFATE 4 MG: 4 INJECTION INTRAVENOUS at 21:45

## 2022-06-24 RX ADMIN — HYDROMORPHONE HYDROCHLORIDE 1 MG: 1 INJECTION, SOLUTION INTRAMUSCULAR; INTRAVENOUS; SUBCUTANEOUS at 22:43

## 2022-06-24 RX ADMIN — SODIUM CHLORIDE 1000 ML: 9 INJECTION, SOLUTION INTRAVENOUS at 21:40

## 2022-06-24 RX ADMIN — IOPAMIDOL 58 ML: 755 INJECTION, SOLUTION INTRAVENOUS at 22:13

## 2022-06-24 RX ADMIN — SODIUM CHLORIDE 84 ML: 9 INJECTION, SOLUTION INTRAVENOUS at 22:19

## 2022-06-24 RX ADMIN — KETOROLAC TROMETHAMINE 15 MG: 15 INJECTION, SOLUTION INTRAMUSCULAR; INTRAVENOUS at 22:38

## 2022-06-24 ASSESSMENT — ENCOUNTER SYMPTOMS
CONSTIPATION: 0
FEVER: 0
COUGH: 0
ABDOMINAL PAIN: 1
SHORTNESS OF BREATH: 1
DIARRHEA: 0

## 2022-06-25 VITALS
DIASTOLIC BLOOD PRESSURE: 79 MMHG | OXYGEN SATURATION: 99 % | TEMPERATURE: 98 F | SYSTOLIC BLOOD PRESSURE: 119 MMHG | RESPIRATION RATE: 21 BRPM | HEART RATE: 70 BPM

## 2022-06-25 NOTE — ED TRIAGE NOTES
Epigastric abd pain and SOB that started this AM.  Pt has had prior episodes of this pain and was seen in ER 6/5 with negative CT PE.  Pain worsened by deep breathing.

## 2022-06-27 LAB
ATRIAL RATE - MUSE: 113 BPM
DIASTOLIC BLOOD PRESSURE - MUSE: NORMAL MMHG
INTERPRETATION ECG - MUSE: NORMAL
P AXIS - MUSE: 28 DEGREES
PR INTERVAL - MUSE: 142 MS
QRS DURATION - MUSE: 76 MS
QT - MUSE: 322 MS
QTC - MUSE: 441 MS
R AXIS - MUSE: 32 DEGREES
SYSTOLIC BLOOD PRESSURE - MUSE: NORMAL MMHG
T AXIS - MUSE: 11 DEGREES
VENTRICULAR RATE- MUSE: 113 BPM

## 2022-07-26 ENCOUNTER — OFFICE VISIT (OUTPATIENT)
Dept: NEUROLOGY | Facility: CLINIC | Age: 22
End: 2022-07-26
Payer: COMMERCIAL

## 2022-07-26 VITALS
DIASTOLIC BLOOD PRESSURE: 77 MMHG | HEART RATE: 103 BPM | WEIGHT: 189 LBS | BODY MASS INDEX: 30.37 KG/M2 | SYSTOLIC BLOOD PRESSURE: 129 MMHG | HEIGHT: 66 IN

## 2022-07-26 DIAGNOSIS — G43.009 MIGRAINE WITHOUT AURA AND WITHOUT STATUS MIGRAINOSUS, NOT INTRACTABLE: Primary | ICD-10-CM

## 2022-07-26 PROCEDURE — 99214 OFFICE O/P EST MOD 30 MIN: CPT | Performed by: PSYCHIATRY & NEUROLOGY

## 2022-07-26 RX ORDER — NORTRIPTYLINE HYDROCHLORIDE 50 MG/1
50 CAPSULE ORAL AT BEDTIME
Qty: 30 CAPSULE | Refills: 6 | Status: SHIPPED | OUTPATIENT
Start: 2022-07-26 | End: 2023-04-04

## 2022-07-26 RX ORDER — METHYLPREDNISOLONE 4 MG
TABLET, DOSE PACK ORAL
COMMUNITY
Start: 2022-07-25 | End: 2023-04-04

## 2022-07-26 RX ORDER — AMOXICILLIN 875 MG
TABLET ORAL
COMMUNITY
Start: 2022-07-25 | End: 2023-04-04

## 2022-07-26 NOTE — NURSING NOTE
Chief Complaint   Patient presents with     Headache     3 month follow up - patient states nortriptyline has helped her      Rebecca Arita CMA on 7/26/2022 at 1:26 PM

## 2022-07-26 NOTE — PROGRESS NOTES
NEUROLOGY FOLLOW UP VISIT  NOTE       [facility] NEUROLOGY [place]  [address] [place] [address]  Tel: [phone]  Fax: [phone]  [email or web address]     Laura Barrera, ROSA 2000, MRN [id number]  PCP: No Ref-Primary, Physician  Date: 2022      ASSESSMENT & PLAN     Visit Diagnosis  1. Migraine without aura and without status migrainosus, not intractable     Migraine headache without aura  22-year-old female with history of scoliosis, s/p kiley placement who is being followed in our clinic for migraine headache without aura.  She was started on nortriptyline and has noticed improvement.  I have asked her to continue on nortriptyline 50 mg at bedtime.  She was encouraged to get MRI scheduled.  She will continue to use Maxalt MLT as needed.  Follow-up will be in 6 months and if her headaches are stable I will taper her off nortriptyline.    Thank you again for this referral, please feel free to contact me if you have any questions.    Dhaval Us MD  [facility]  ([facility], [facility]     HISTORY OF PRESENT ILLNESS     Patient is a 22-year-old female with history of scoliosis, s/p kiley placement who was initially evaluated on 3/31/2022 for progressively worsening headaches.  She was diagnosed with migraine headache without aura and MRI brain was ordered.  She was also started on nortriptyline and Maxalt.  MRI was not done as she needed to find out from the surgeon if the rods placed or scoliosis are compatible with MRI scan.  She was seen couple of times in the ER with chest pain and tightness and had CT of the chest that was unremarkable.  And was discharged.  Since her last visit she reports improvement in her headache.  She is not getting headaches as frequently.  She has noticed she usually gets headaches with change in barometric pressure and is wondering if she has allergies although she denies any nasal  "congestion, runny nose or watering in the eye.  She has found out that her rods are MRI compatible and is planning on getting MRI in the near future     PROBLEM LIST   Patient Active Problem List   Diagnosis Code     Migraine without aura and without status migrainosus, not intractable G43.009         PAST MEDICAL & SURGICAL HISTORY     Past Medical History:   Patient  has a past medical history of Scoliosis.    Surgical History:  She  has a past surgical history that includes Spine surgery (2013) and Cholecystostomy (2017).     SOCIAL HISTORY     Reviewed, and she  reports that she has never smoked. She has never used smokeless tobacco.     FAMILY HISTORY     Reviewed, and family history includes Cancer in her mother.     ALLERGIES     No Known Allergies      REVIEW OF SYSTEMS     A 12 point review of system was performed and was negative except as outlined in the history of present illness.     HOME MEDICATIONS     Current Outpatient Rx   Medication Sig Dispense Refill     amoxicillin (AMOXIL) 875 MG tablet        methylPREDNISolone (MEDROL DOSEPAK) 4 MG tablet therapy pack        nortriptyline (PAMELOR) 50 MG capsule Take 1 capsule (50 mg) by mouth At Bedtime 30 capsule 6     rizatriptan (MAXALT-MLT) 10 MG ODT Take 1 tablet (10 mg) by mouth at onset of headache for migraine May repeat in 2 hours. Max 3 tablets/24 hours. 18 tablet 3     gabapentin (NEURONTIN) 300 MG capsule Take 1 capsule (300 mg) by mouth 3 times daily (Patient not taking: Reported on 7/26/2022) 30 capsule 0     nortriptyline (PAMELOR) 25 MG capsule 1 PO at bedtime x 1 week then 2 PO QHS (Patient not taking: Reported on 7/26/2022) 60 capsule 6         PHYSICAL EXAM     Vital signs  /77 (BP Location: Left arm, Patient Position: Sitting)   Pulse 103   Ht 1.676 m (5' 6\")   Wt 85.7 kg (189 lb)   BMI 30.51 kg/m      Weight:   189 lbs 0 oz    Patient is alert and oriented x4 in no acute distress. Vital signs were reviewed and are documented " in electronic medical record. Neck was supple, no carotid bruits, thyromegaly, JVD, or lymphadenopathy was noted.   NEUROLOGY EXAM:    Patient s speech was normal with no aphasia or dysarthria. Mentation, and affect were also normal.     Funduscopic exam was normal, with normal cup to disc ratio. Cranial nerves II -XII were intact.     Patient had normal mass, tone and motor strength was 5/5 in all extremities without pronator drift.     Sensation was intact to light touch, pinprick, and vibratory sensation.     Reflexes were 1+ symmetrical with downgoing toes.     No dysmetria noted on FNF or HKS. Romberg was negative.    Gait testing was normal. Able to walk on toes/heels. Tandem walk normal.     PERTINENT DIAGNOSTIC STUDIES     Following studies were reviewed:     CT BRAIN 3/27/2022  1.  No evidence of acute intracranial hemorrhage or mass effect.     PERTINENT LABS  Following labs were reviewed:  Admission on 06/24/2022, Discharged on 06/25/2022   Component Date Value     Ventricular Rate 06/24/2022 113      Atrial Rate 06/24/2022 113      [place] Interval 06/24/2022 142      QRS Duration 06/24/2022 76      QT 06/24/2022 322      QTc 06/24/2022 441      P Axis 06/24/2022 28      R AXIS 06/24/2022 32      T [place] 06/24/2022 11      Interpretation ECG 06/24/2022                      Value:Sinus tachycardia  Otherwise normal ECG  When compared with ECG of 05-JUN-2022 22:59,  No significant change was found  Confirmed by - EMERGENCY ROOM, PHYSICIAN ([id number]),  MIGUELINA GARCIA (Moody) on 6/27/2022 6:45:59 AM       Troponin I High Sensitiv* 06/24/2022 <3      Sodium 06/24/2022 141      Potassium 06/24/2022 3.7      Chloride 06/24/2022 108      Carbon Dioxide (CO2) 06/24/2022 23      Anion Gap 06/24/2022 10      Urea Nitrogen 06/24/2022 9      Creatinine 06/24/2022 0.53      Calcium 06/24/2022 9.2      Glucose 06/24/2022 93      Alkaline Phosphatase 06/24/2022 146      AST 06/24/2022 35      ALT 06/24/2022 101 (A)      Protein Total 06/24/2022 8.0      Albumin 06/24/2022 4.0      Bilirubin Total 06/24/2022 0.3      GFR Estimate 06/24/2022 >90      Lipase 06/24/2022 102      INR 06/24/2022 1.07      D-Dimer Quantitative 06/24/2022 1.79 (A)     SARS CoV2 PCR 06/24/2022 Negative      WBC Count 06/24/2022 11.1 (A)     RBC Count 06/24/2022 5.01      Hemoglobin 06/24/2022 14.2      Hematocrit 06/24/2022 43.6      MCV 06/24/2022 87      MCH 06/24/2022 28.3      MCHC 06/24/2022 32.6      RDW 06/24/2022 12.2      Platelet Count 06/24/2022 358      % Neutrophils 06/24/2022 71      % Lymphocytes 06/24/2022 18      % Monocytes 06/24/2022 7      % Eosinophils 06/24/2022 2      % Basophils 06/24/2022 1      % Immature Granulocytes 06/24/2022 1      NRBCs per 100 WBC 06/24/2022 0      Absolute Neutrophils 06/24/2022 8.0      Absolute Lymphocytes 06/24/2022 2.0      Absolute Monocytes 06/24/2022 0.8      Absolute Eosinophils 06/24/2022 0.2      Absolute Basophils 06/24/2022 0.1      Absolute Immature Granul* 06/24/2022 0.1      Absolute NRBCs 06/24/2022 0.0      HCG Qualitative POCT 06/24/2022 Negative    Admission on 06/05/2022, Discharged on 06/06/2022   Component Date Value     Sodium 06/05/2022 141      Potassium 06/05/2022 3.6      Chloride 06/05/2022 108      Carbon Dioxide (CO2) 06/05/2022 25      Anion Gap 06/05/2022 8      Urea Nitrogen 06/05/2022 7      Creatinine 06/05/2022 0.51 (A)     Calcium 06/05/2022 8.8      Glucose 06/05/2022 109 (A)     GFR Estimate 06/05/2022 >90      Troponin I High Sensitiv* 06/05/2022 <3      D-Dimer Quantitative 06/05/2022 0.62 (A)     Ventricular Rate 06/05/2022 101      Atrial Rate 06/05/2022 101      FL Interval 06/05/2022 128      QRS Duration 06/05/2022 78      QT 06/05/2022 338      QTc 06/05/2022 438      P Axis 06/05/2022 23      R AXIS 06/05/2022 43      T [place] 06/05/2022 15      Interpretation ECG 06/05/2022                      Value:Sinus tachycardia  Otherwise normal ECG  When compared with  ECG of 28-APR-2021 13:41,  Vent. rate has increased BY  38 BPM       WBC Count 06/05/2022 9.7      RBC Count 06/05/2022 4.91      Hemoglobin 06/05/2022 14.1      Hematocrit 06/05/2022 43.3      MCV 06/05/2022 88      MCH 06/05/2022 28.7      MCHC 06/05/2022 32.6      RDW 06/05/2022 12.7      Platelet Count 06/05/2022 327      % Neutrophils 06/05/2022 56      % Lymphocytes 06/05/2022 31      % Monocytes 06/05/2022 8      % Eosinophils 06/05/2022 3      % Basophils 06/05/2022 1      % Immature Granulocytes 06/05/2022 1      NRBCs per 100 WBC 06/05/2022 0      Absolute Neutrophils 06/05/2022 5.6      Absolute Lymphocytes 06/05/2022 3.0      Absolute Monocytes 06/05/2022 0.8      Absolute Eosinophils 06/05/2022 0.2      Absolute Basophils 06/05/2022 0.1      Absolute Immature Granul* 06/05/2022 0.1      Absolute NRBCs 06/05/2022 0.0      hCG Serum Qualitative 06/05/2022 Negative          Total time spent for face to face visit, reviewing labs/imaging studies, counseling and coordination of care was: 30 Minutes spent on the date of the encounter doing chart review, review of outside records, review of test results, interpretation of tests, patient visit and documentation       This note was dictated using voice recognition software.  Any grammatical or context distortions are unintentional and inherent to the software.    No orders of the defined types were placed in this encounter.     New Prescriptions    No medications on file     Modified Medications    No medications on file

## 2022-07-26 NOTE — LETTER
July 26, 2022      Laura Barrera  [address] [address] [address] [facility] [address]        To Whom It May Concern:    Laura Barrera was seen in our clinic on 7/26/2022. She may return to work without restrictions.      Sincerely,        Dhaval Us MD

## 2022-11-04 ENCOUNTER — HOSPITAL ENCOUNTER (EMERGENCY)
Facility: HOSPITAL | Age: 22
Discharge: HOME | End: 2022-11-04
Admitting: PHYSICIAN ASSISTANT
Payer: COMMERCIAL

## 2022-11-04 VITALS
OXYGEN SATURATION: 97 % | SYSTOLIC BLOOD PRESSURE: 138 MMHG | RESPIRATION RATE: 18 BRPM | HEART RATE: 93 BPM | TEMPERATURE: 98.5 F | HEIGHT: 66 IN | WEIGHT: 195 LBS | DIASTOLIC BLOOD PRESSURE: 80 MMHG | BODY MASS INDEX: 31.34 KG/M2

## 2022-11-04 DIAGNOSIS — R03.0 ELEVATED BLOOD PRESSURE READING WITHOUT DIAGNOSIS OF HYPERTENSION: ICD-10-CM

## 2022-11-04 DIAGNOSIS — J39.2 THROAT IRRITATION: ICD-10-CM

## 2022-11-04 DIAGNOSIS — F41.9 ANXIETY: ICD-10-CM

## 2022-11-04 DIAGNOSIS — J34.89 SINUS PRESSURE: ICD-10-CM

## 2022-11-04 LAB
DEPRECATED S PYO AG THROAT QL EIA: NEGATIVE
FLUAV RNA SPEC QL NAA+PROBE: NEGATIVE
FLUBV RNA RESP QL NAA+PROBE: NEGATIVE
RSV RNA SPEC NAA+PROBE: NEGATIVE
SARS-COV-2 RNA RESP QL NAA+PROBE: NEGATIVE

## 2022-11-04 PROCEDURE — 99284 EMERGENCY DEPT VISIT MOD MDM: CPT

## 2022-11-04 PROCEDURE — C9803 HOPD COVID-19 SPEC COLLECT: HCPCS

## 2022-11-04 PROCEDURE — 87637 SARSCOV2&INF A&B&RSV AMP PRB: CPT | Performed by: EMERGENCY MEDICINE

## 2022-11-04 PROCEDURE — 250N000013 HC RX MED GY IP 250 OP 250 PS 637: Performed by: EMERGENCY MEDICINE

## 2022-11-04 PROCEDURE — 87651 STREP A DNA AMP PROBE: CPT | Performed by: EMERGENCY MEDICINE

## 2022-11-04 RX ORDER — IBUPROFEN 600 MG/1
600 TABLET, FILM COATED ORAL ONCE
Status: COMPLETED | OUTPATIENT
Start: 2022-11-04 | End: 2022-11-04

## 2022-11-04 RX ORDER — CETIRIZINE HYDROCHLORIDE 10 MG/1
10 TABLET ORAL DAILY
Qty: 30 TABLET | Refills: 0 | Status: SHIPPED | OUTPATIENT
Start: 2022-11-04 | End: 2022-12-04

## 2022-11-04 RX ORDER — HYDROXYZINE HYDROCHLORIDE 25 MG/1
25 TABLET, FILM COATED ORAL 3 TIMES DAILY PRN
Qty: 15 TABLET | Refills: 0 | Status: SHIPPED | OUTPATIENT
Start: 2022-11-04 | End: 2023-04-04

## 2022-11-04 RX ADMIN — IBUPROFEN 600 MG: 600 TABLET, FILM COATED ORAL at 17:22

## 2022-11-04 NOTE — ED NOTES
"ED Triage Provider Note  [facility] EMERGENCY DEPARTMENT  Encounter Date: Nov 4, 2022    History:  Chief Complaint   Patient presents with     Sinusitis     Sore throat     Laura Barrera is a 22 year old female who presents to the ED with sore throat.    Seen 2d ago for rhinorrhea/sore throat through [facility] and was told she had a sinus infection and was told to come to the ER if things worsen.  Instructed to do sinus rinse and Flonase nasal spray which she has been doing.  The sore throat was worse prompting ED visit.  Has not taken anything for sore throat.      Review of Systems:  +sore throat    Exam:  BP (!) 145/87   Pulse 100   Temp 98.5  F (36.9  C) (Oral)   Resp 18   Ht 1.676 m (5' 6\")   Wt 88.5 kg (195 lb)   SpO2 98%   BMI 31.47 kg/m    General: Patient is anxious, hyperventilating  Cardio: normal rate, extremities well perfused  Resp: Normal work of breathing, grossly normal respiratory rate  Neuro: Alert. Facial movement grossly symmetric. Grossly intact strength.   No swelling or erythema of posterior pharynx, no uvular deviation    Medical Decision Making:  Patient arriving to the ED with problem as above. A medical screening exam was performed. Initial differential diagnosis includes but not limited to URI, strep pharyngitis.    Rapid strep and COVID/flu/RSV swab orders initiated from Triage. The patient is most appropriate to return to the waiting room.       Ambar Feng MD  11/4/2022 at 3:52 PM     Ambar Feng MD  11/04/22 1555    "

## 2022-11-04 NOTE — ED TRIAGE NOTES
Diagnosed with sinusitis and is getting worse. States sore throat and feels like she is sob and feels like heart is racing     Triage Assessment     Row Name 11/04/22 3587       Triage Assessment (Adult)    Airway WDL WDL

## 2022-11-05 LAB — GROUP A STREP BY PCR: NOT DETECTED

## 2022-11-05 ASSESSMENT — ENCOUNTER SYMPTOMS
DYSURIA: 0
FEVER: 0
SORE THROAT: 1
SHORTNESS OF BREATH: 0
COUGH: 1
SINUS PRESSURE: 1
CHILLS: 0

## 2022-11-05 NOTE — ED PROVIDER NOTES
EMERGENCY DEPARTMENT ENCOUNTER      NAME: Laura Barrera  AGE: 22 year old female  YOB: 2000  MRN: [id number]  EVALUATION DATE & TIME: No admission date for patient encounter.    PCP: No Ref-Primary, Physician    ED PROVIDER: Hannah Regalado PA-C      Chief Complaint   Patient presents with     Sinusitis     Sore throat         FINAL IMPRESSION:  1. Sinus pressure    2. Throat irritation    3. Anxiety    4. Elevated blood pressure reading without diagnosis of hypertension          ED COURSE & MEDICAL DECISION MAKIN:01 PM I introduced myself to patient, performed initial HPI and examination.     22 year old female with PMH migraine presents to the Emergency Department for evaluation of nasal congestion, sinus pressure, sore throat, cough.  Recently treated for a sinus infection with fluticasone, no antibiotics.  Denies any known seasonal allergies.  No fevers.     Also endorsing worsening panic attacks and anxiety.  No documented history of anxiety, but she does report a lot of stressors and feeling a sense of impending doom every night suspicious for a panic attack.  Does not take anything regularly for these.    Vital signs are unremarkable.  Exam with well-appearing female.  Does have some nasal congestion, no sinus tenderness.  Ears are unremarkable.  Oropharynx is clear.  Presentation is most suspicious for seasonal allergies, low suspicion for bacterial sinusitis and symptoms for only 7 days.  No indication for antibiotics at this time.  Patient is already on fluticasone, will have patient's start on Zyrtec as well.  No evidence of otitis media, not consistent with strep, peritonsillar abscess, retropharyngeal abscess, epiglottitis.  Heart and lungs are clear, nothing to suggest pneumonia.  No fevers and presentation is not consistent with COVID or influenza.    Patient does endorse anxiety.  Patient is in a safe situation, no concern for self-harm.  Not currently established with primary  care, therapist, or psychiatrist.  We will plan to discharge with hydroxyzine as needed for anxiety and discussed use.  Encouraged close follow-up with PCP for further evaluation.  Referral for PCP was placed.    Patient also reports she has [facility], [facility] for [facility].  She is in a monogamous relationship, however her partner was not treated when she was treated.  Suspect likely reinfection.  Encouraged she be rechecked and have partner rechecked/treated as well.  Provided with [facility] information for herself and partner as needed.    Overall work-up is reassuring.  Discussed at home supportive management, plan for follow-up and given close ED return precautions.  All questions were answered to the best my ability and patient is agreeable with plan.      MEDICATIONS GIVEN IN THE EMERGENCY:  Medications   ibuprofen (ADVIL/MOTRIN) tablet 600 mg (600 mg Oral Given 11/4/22 1722)       NEW PRESCRIPTIONS STARTED AT TODAY'S ER VISIT  Discharge Medication List as of 11/4/2022  7:11 PM      START taking these medications    Details   cetirizine (ZYRTEC) 10 MG tablet Take 1 tablet (10 mg) by mouth daily for 30 days, Disp-30 tablet, R-0, Local Print      hydrOXYzine (ATARAX) 25 MG tablet Take 1 tablet (25 mg) by mouth 3 times daily as needed for itching, Disp-15 tablet, R-0, Local Print                =================================================================    HPI    Patient information was obtained from: Patient    Use of : N/A       Laura TONG Bruce is a 22 year old female with a pertinent history of migraine who presents to this ED for evaluation of nasal congestion, sinus pressure, right ear pain, headaches, now with throat itching and pain diffusely, worse on the right side.  Reports she was seen at urgent care a week ago, diagnosed with sinusitis.  Started on Flonase nasal spray which is not significantly improving her symptoms.  No fevers.  No difficulty  "swallowing.  No new cough, chest pain, shortness of breath.  Was dealing with some nausea and vomiting few weeks ago, since resolved.    No known history of allergies.  Does not take anything for allergies.    Does report her anxiety is been more severe recently.  Endorses near panic attacks.  No formal diagnosis of anxiety, does not take anything regularly.  Is not currently established with PCP.      REVIEW OF SYSTEMS   Review of Systems   Constitutional: Negative for chills and fever.   HENT: Positive for congestion, ear pain, sinus pressure and sore throat.    Respiratory: Positive for cough. Negative for shortness of breath.    Cardiovascular: Negative for chest pain.   Genitourinary: Negative for dysuria, vaginal bleeding and vaginal discharge.   Skin: Negative for rash.   All other systems reviewed and are negative.       PAST MEDICAL HISTORY:  Past Medical History:   Diagnosis Date     Scoliosis        PAST SURGICAL HISTORY:  Past Surgical History:   Procedure Laterality Date     CHOLECYSTOSTOMY  2017     SPINE SURGERY  2013    hx scoliosis       CURRENT MEDICATIONS:    cetirizine (ZYRTEC) 10 MG tablet  hydrOXYzine (ATARAX) 25 MG tablet  amoxicillin (AMOXIL) 875 MG tablet  methylPREDNISolone (MEDROL DOSEPAK) 4 MG tablet therapy pack  nortriptyline (PAMELOR) 50 MG capsule  rizatriptan (MAXALT-MLT) 10 MG ODT        ALLERGIES:  Allergies   Allergen Reactions     Diazepam        FAMILY HISTORY:  Family History   Problem Relation Age of Onset     Cancer Mother        SOCIAL HISTORY:   Social History     Socioeconomic History     Marital status: Single   Tobacco Use     Smoking status: Never     Smokeless tobacco: Never       VITALS:  /80   Pulse 93   Temp 98.5  F (36.9  C) (Oral)   Resp 18   Ht 1.676 m (5' 6\")   Wt 88.5 kg (195 lb)   SpO2 97%   BMI 31.47 kg/m      PHYSICAL EXAM    Constitutional: Well developed, Well nourished, NAD, GCS 15   HENT: Normocephalic, Atraumatic, Oropharynx clear, tonsils " 2+, uvula midline. No tenderness with palpation to sinuses. + Nasal congestion. TMs WNL.    Neck- Supple, Nontender. Normal ROM. No stridor. No lymphadenopathy  Eyes: Conjunctiva normal. PERRL. EOM intact.   Respiratory: No respiratory distress, speaking in full sentences. Normal breath sounds, No wheezing, No cough  Cardiovascular: Normal heart rate, Regular rhythm, No murmurs.    GI: Soft, nontender.   Musculoskeletal: No deformities, Moves all extremities equally  Integument: Warm, Dry, No erythema, ecchymosis, or rash  Neurologic: Alert & oriented x 3, Normal sensory function. No focal deficits  Psychiatric: Affect normal, anxious appearing     LAB:  All pertinent labs reviewed and interpreted.  Results for orders placed or performed during the hospital encounter of 11/04/22   Symptomatic; Unknown Influenza A/B & SARS-CoV2 (COVID-19) Virus PCR Multiplex Nasopharyngeal    Specimen: Nasopharyngeal; Swab   Result Value Ref Range    Influenza A PCR Negative Negative    Influenza B PCR Negative Negative    RSV PCR Negative Negative    SARS CoV2 PCR Negative Negative   Streptococcus A Rapid Scr w Reflx to PCR    Specimen: Throat; Swab   Result Value Ref Range    Group A Strep antigen Negative Negative       RADIOLOGY:  Reviewed all pertinent imaging. Please see official radiology report.  No orders to display       EKG:    None    PROCEDURES:   None        Hannah Regalado PA-C  Emergency Medicine  [facility] EMERGENCY DEPARTMENT  [facility] [address] [phone]             Hannah Regalado PA-C  11/05/22 0112

## 2022-11-05 NOTE — DISCHARGE INSTRUCTIONS
Continue with the fluticasone nasal spray.  Start Zyrtec.    Consider humidifier in your room at night.  Make sure you are drinking plenty of fluids to stay hydrated.    You can use hydroxyzine as needed for anxiety.  This can make you sleepy, especially good to take at night    I have placed a referral for an allergist.  I have also placed a referral for primary care to discuss your anxiety and other concerns.  Additionally, your blood pressure is elevated today and will need to be rechecked in clinic.      You may go to the [facility], where they do confidential testing/treatment.  [phone]  [email or web address]  [address] [address]  Appointments and walk-ins:   Monday: 8 to 7  Tuesday: 8 to 4  Wednesday: 8 to 7  Thursday: 10 to 4  Friday: 8 to 4

## 2022-11-19 ENCOUNTER — HOSPITAL ENCOUNTER (EMERGENCY)
Facility: CLINIC | Age: 22
Discharge: HOME | End: 2022-11-19
Attending: EMERGENCY MEDICINE | Admitting: EMERGENCY MEDICINE
Payer: COMMERCIAL

## 2022-11-19 ENCOUNTER — APPOINTMENT (OUTPATIENT)
Dept: GENERAL RADIOLOGY | Facility: CLINIC | Age: 22
End: 2022-11-19
Attending: EMERGENCY MEDICINE
Payer: COMMERCIAL

## 2022-11-19 ENCOUNTER — APPOINTMENT (OUTPATIENT)
Dept: CT IMAGING | Facility: CLINIC | Age: 22
End: 2022-11-19
Attending: EMERGENCY MEDICINE
Payer: COMMERCIAL

## 2022-11-19 VITALS
DIASTOLIC BLOOD PRESSURE: 70 MMHG | RESPIRATION RATE: 16 BRPM | HEART RATE: 100 BPM | SYSTOLIC BLOOD PRESSURE: 121 MMHG | OXYGEN SATURATION: 100 % | TEMPERATURE: 100.8 F

## 2022-11-19 DIAGNOSIS — E86.0 DEHYDRATION: ICD-10-CM

## 2022-11-19 DIAGNOSIS — J10.1 INFLUENZA A: Primary | ICD-10-CM

## 2022-11-19 LAB
ALBUMIN SERPL BCG-MCNC: 4.8 G/DL (ref 3.5–5.2)
ALBUMIN UR-MCNC: NEGATIVE MG/DL
ALP SERPL-CCNC: 126 U/L (ref 35–104)
ALT SERPL W P-5'-P-CCNC: 98 U/L (ref 10–35)
ANION GAP BLD CALCULATED.3IONS-SCNC: 16 MMOL/L (ref 3–14)
ANION GAP SERPL CALCULATED.3IONS-SCNC: 16 MMOL/L (ref 7–15)
APPEARANCE UR: CLEAR
AST SERPL W P-5'-P-CCNC: 87 U/L (ref 10–35)
BASOPHILS # BLD AUTO: 0 10E3/UL (ref 0–0.2)
BASOPHILS NFR BLD AUTO: 1 %
BILIRUB SERPL-MCNC: 0.7 MG/DL
BILIRUB UR QL STRIP: NEGATIVE
BUN SERPL-MCNC: 4 MG/DL (ref 6–20)
BUN SERPL-MCNC: <3 MG/DL (ref 7–30)
CA-I BLD-MCNC: 5 MG/DL (ref 4.4–5.2)
CALCIUM SERPL-MCNC: 10 MG/DL (ref 8.6–10)
CHLORIDE BLD-SCNC: 107 MMOL/L (ref 94–109)
CHLORIDE SERPL-SCNC: 104 MMOL/L (ref 98–107)
CO2 BLD-SCNC: 21 MMOL/L (ref 20–32)
COLOR UR AUTO: ABNORMAL
CREAT BLD-MCNC: 0.6 MG/DL (ref 0.5–1)
CREAT SERPL-MCNC: 0.73 MG/DL (ref 0.51–0.95)
CRP SERPL-MCNC: 11.02 MG/L
D DIMER PPP FEU-MCNC: 1.12 UG/ML FEU (ref 0–0.5)
DEPRECATED HCO3 PLAS-SCNC: 17 MMOL/L (ref 22–29)
EOSINOPHIL # BLD AUTO: 0 10E3/UL (ref 0–0.7)
EOSINOPHIL NFR BLD AUTO: 0 %
ERYTHROCYTE [DISTWIDTH] IN BLOOD BY AUTOMATED COUNT: 12.5 % (ref 10–15)
ERYTHROCYTE [SEDIMENTATION RATE] IN BLOOD BY WESTERGREN METHOD: 9 MM/HR (ref 0–20)
FLUAV RNA SPEC QL NAA+PROBE: POSITIVE
FLUBV RNA RESP QL NAA+PROBE: NEGATIVE
GFR SERPL CREATININE-BSD FRML MDRD: >90 ML/MIN/1.73M2
GLUCOSE BLD-MCNC: 92 MG/DL (ref 70–99)
GLUCOSE SERPL-MCNC: 98 MG/DL (ref 70–99)
GLUCOSE UR STRIP-MCNC: NEGATIVE MG/DL
HCO3 BLDV-SCNC: 18 MMOL/L (ref 21–28)
HCO3 BLDV-SCNC: 20 MMOL/L (ref 21–28)
HCT VFR BLD AUTO: 41.9 % (ref 35–47)
HCT VFR BLD CALC: 36 % (ref 35–47)
HGB BLD-MCNC: 12.2 G/DL (ref 11.7–15.7)
HGB BLD-MCNC: 13.8 G/DL (ref 11.7–15.7)
HGB UR QL STRIP: ABNORMAL
HOLD SPECIMEN: NORMAL
HOLD SPECIMEN: NORMAL
IMM GRANULOCYTES # BLD: 0 10E3/UL
IMM GRANULOCYTES NFR BLD: 1 %
KETONES UR STRIP-MCNC: 20 MG/DL
LACTATE BLD-SCNC: 0.8 MMOL/L
LACTATE BLD-SCNC: 1.4 MMOL/L
LEUKOCYTE ESTERASE UR QL STRIP: NEGATIVE
LYMPHOCYTES # BLD AUTO: 0.2 10E3/UL (ref 0.8–5.3)
LYMPHOCYTES NFR BLD AUTO: 3 %
MAGNESIUM SERPL-MCNC: 2 MG/DL (ref 1.7–2.3)
MCH RBC QN AUTO: 28 PG (ref 26.5–33)
MCHC RBC AUTO-ENTMCNC: 32.9 G/DL (ref 31.5–36.5)
MCV RBC AUTO: 85 FL (ref 78–100)
MONOCYTES # BLD AUTO: 0.5 10E3/UL (ref 0–1.3)
MONOCYTES NFR BLD AUTO: 7 %
MUCOUS THREADS #/AREA URNS LPF: PRESENT /LPF
NEUTROPHILS # BLD AUTO: 6 10E3/UL (ref 1.6–8.3)
NEUTROPHILS NFR BLD AUTO: 88 %
NITRATE UR QL: NEGATIVE
NRBC # BLD AUTO: 0 10E3/UL
NRBC BLD AUTO-RTO: 0 /100
NT-PROBNP SERPL-MCNC: 148 PG/ML (ref 0–450)
PCO2 BLDV: 25 MM HG (ref 40–50)
PCO2 BLDV: 31 MM HG (ref 40–50)
PH BLDV: 7.43 [PH] (ref 7.32–7.43)
PH BLDV: 7.47 [PH] (ref 7.32–7.43)
PH UR STRIP: 6.5 [PH] (ref 5–7)
PLATELET # BLD AUTO: 245 10E3/UL (ref 150–450)
PO2 BLDV: 22 MM HG (ref 25–47)
PO2 BLDV: 29 MM HG (ref 25–47)
POTASSIUM BLD-SCNC: 3.7 MMOL/L (ref 3.4–5.3)
POTASSIUM SERPL-SCNC: 3.7 MMOL/L (ref 3.4–5.3)
PROT SERPL-MCNC: 7.6 G/DL (ref 6.4–8.3)
RBC # BLD AUTO: 4.92 10E6/UL (ref 3.8–5.2)
RBC URINE: 2 /HPF
RSV RNA SPEC NAA+PROBE: NEGATIVE
SAO2 % BLDV: 44 % (ref 94–100)
SAO2 % BLDV: 58 % (ref 94–100)
SARS-COV-2 RNA RESP QL NAA+PROBE: NEGATIVE
SODIUM BLD-SCNC: 140 MMOL/L (ref 133–144)
SODIUM SERPL-SCNC: 137 MMOL/L (ref 136–145)
SP GR UR STRIP: 1.02 (ref 1–1.03)
SQUAMOUS EPITHELIAL: 11 /HPF
TROPONIN T SERPL HS-MCNC: 7 NG/L
UROBILINOGEN UR STRIP-MCNC: NORMAL MG/DL
WBC # BLD AUTO: 6.8 10E3/UL (ref 4–11)
WBC URINE: 3 /HPF

## 2022-11-19 PROCEDURE — 83880 ASSAY OF NATRIURETIC PEPTIDE: CPT | Performed by: EMERGENCY MEDICINE

## 2022-11-19 PROCEDURE — 85014 HEMATOCRIT: CPT

## 2022-11-19 PROCEDURE — 85025 COMPLETE CBC W/AUTO DIFF WBC: CPT | Performed by: EMERGENCY MEDICINE

## 2022-11-19 PROCEDURE — 83735 ASSAY OF MAGNESIUM: CPT | Performed by: EMERGENCY MEDICINE

## 2022-11-19 PROCEDURE — 250N000011 HC RX IP 250 OP 636: Performed by: EMERGENCY MEDICINE

## 2022-11-19 PROCEDURE — 82040 ASSAY OF SERUM ALBUMIN: CPT | Performed by: EMERGENCY MEDICINE

## 2022-11-19 PROCEDURE — 87637 SARSCOV2&INF A&B&RSV AMP PRB: CPT | Performed by: EMERGENCY MEDICINE

## 2022-11-19 PROCEDURE — 85652 RBC SED RATE AUTOMATED: CPT | Performed by: EMERGENCY MEDICINE

## 2022-11-19 PROCEDURE — 99285 EMERGENCY DEPT VISIT HI MDM: CPT | Mod: CS,25

## 2022-11-19 PROCEDURE — 85379 FIBRIN DEGRADATION QUANT: CPT | Performed by: EMERGENCY MEDICINE

## 2022-11-19 PROCEDURE — 96374 THER/PROPH/DIAG INJ IV PUSH: CPT | Mod: 59

## 2022-11-19 PROCEDURE — C9803 HOPD COVID-19 SPEC COLLECT: HCPCS

## 2022-11-19 PROCEDURE — 83605 ASSAY OF LACTIC ACID: CPT

## 2022-11-19 PROCEDURE — 258N000003 HC RX IP 258 OP 636: Performed by: EMERGENCY MEDICINE

## 2022-11-19 PROCEDURE — 71275 CT ANGIOGRAPHY CHEST: CPT

## 2022-11-19 PROCEDURE — 96361 HYDRATE IV INFUSION ADD-ON: CPT

## 2022-11-19 PROCEDURE — 36415 COLL VENOUS BLD VENIPUNCTURE: CPT | Performed by: EMERGENCY MEDICINE

## 2022-11-19 PROCEDURE — 80053 COMPREHEN METABOLIC PANEL: CPT | Performed by: EMERGENCY MEDICINE

## 2022-11-19 PROCEDURE — 81001 URINALYSIS AUTO W/SCOPE: CPT | Performed by: EMERGENCY MEDICINE

## 2022-11-19 PROCEDURE — 93005 ELECTROCARDIOGRAM TRACING: CPT

## 2022-11-19 PROCEDURE — 84484 ASSAY OF TROPONIN QUANT: CPT | Performed by: EMERGENCY MEDICINE

## 2022-11-19 PROCEDURE — 250N000009 HC RX 250: Performed by: EMERGENCY MEDICINE

## 2022-11-19 PROCEDURE — 86140 C-REACTIVE PROTEIN: CPT | Performed by: EMERGENCY MEDICINE

## 2022-11-19 PROCEDURE — 71046 X-RAY EXAM CHEST 2 VIEWS: CPT

## 2022-11-19 PROCEDURE — 250N000013 HC RX MED GY IP 250 OP 250 PS 637: Performed by: EMERGENCY MEDICINE

## 2022-11-19 RX ORDER — IOPAMIDOL 755 MG/ML
500 INJECTION, SOLUTION INTRAVASCULAR ONCE
Status: COMPLETED | OUTPATIENT
Start: 2022-11-19 | End: 2022-11-19

## 2022-11-19 RX ORDER — ONDANSETRON 2 MG/ML
4 INJECTION INTRAMUSCULAR; INTRAVENOUS ONCE
Status: COMPLETED | OUTPATIENT
Start: 2022-11-19 | End: 2022-11-19

## 2022-11-19 RX ORDER — IBUPROFEN 600 MG/1
600 TABLET, FILM COATED ORAL ONCE
Status: COMPLETED | OUTPATIENT
Start: 2022-11-19 | End: 2022-11-19

## 2022-11-19 RX ORDER — ONDANSETRON 4 MG/1
4 TABLET, FILM COATED ORAL EVERY 8 HOURS PRN
Qty: 10 TABLET | Refills: 0 | Status: SHIPPED | OUTPATIENT
Start: 2022-11-19 | End: 2023-04-04

## 2022-11-19 RX ORDER — ACETAMINOPHEN 325 MG/1
975 TABLET ORAL ONCE
Status: COMPLETED | OUTPATIENT
Start: 2022-11-19 | End: 2022-11-19

## 2022-11-19 RX ADMIN — IBUPROFEN 600 MG: 600 TABLET ORAL at 16:25

## 2022-11-19 RX ADMIN — ONDANSETRON 4 MG: 2 INJECTION INTRAMUSCULAR; INTRAVENOUS at 16:25

## 2022-11-19 RX ADMIN — IOPAMIDOL 65 ML: 755 INJECTION, SOLUTION INTRAVENOUS at 13:45

## 2022-11-19 RX ADMIN — SODIUM CHLORIDE, POTASSIUM CHLORIDE, SODIUM LACTATE AND CALCIUM CHLORIDE 1000 ML: 600; 310; 30; 20 INJECTION, SOLUTION INTRAVENOUS at 13:40

## 2022-11-19 RX ADMIN — SODIUM CHLORIDE 85 ML: 9 INJECTION, SOLUTION INTRAVENOUS at 13:45

## 2022-11-19 RX ADMIN — SODIUM CHLORIDE, POTASSIUM CHLORIDE, SODIUM LACTATE AND CALCIUM CHLORIDE 1000 ML: 600; 310; 30; 20 INJECTION, SOLUTION INTRAVENOUS at 11:13

## 2022-11-19 RX ADMIN — ACETAMINOPHEN 975 MG: 325 TABLET, FILM COATED ORAL at 11:03

## 2022-11-19 ASSESSMENT — ENCOUNTER SYMPTOMS
BLOOD IN STOOL: 0
MYALGIAS: 1
NUMBNESS: 1
LIGHT-HEADEDNESS: 1
EYE PAIN: 0
FREQUENCY: 1
COLOR CHANGE: 0
FEVER: 1
DYSURIA: 0
COUGH: 1
HEMATURIA: 0
RHINORRHEA: 1
SORE THROAT: 1
NAUSEA: 1
DIARRHEA: 0
CHILLS: 1
AGITATION: 0
BACK PAIN: 1
SHORTNESS OF BREATH: 1
VOMITING: 1

## 2022-11-19 ASSESSMENT — ACTIVITIES OF DAILY LIVING (ADL)
ADLS_ACUITY_SCORE: 35

## 2022-11-19 NOTE — ED NOTES
Ambulated pt down ED hallway approximately 80 feet. Pt stated they did not feel dizzy or SOB during ambulation. Pt's sats stayed above 95% and HR was in the 110s. Pt had a steady gait that is normal for them.

## 2022-11-19 NOTE — ED TRIAGE NOTES
Pt here for tacycardia, chest pain, shortness of breath, lightheadedness, cough, and loosing sensation in upper and lower extremities. Pt also endorses abdominal and back pain     Triage Assessment       Row Name 11/19/22 1030       Triage Assessment (Adult)    Airway WDL WDL       Respiratory WDL    Respiratory WDL X;rhythm/pattern    Rhythm/Pattern, Respiratory shortness of breath;tachypneic       Skin Circulation/Temperature WDL    Skin Circulation/Temperature WDL WDL       Cardiac WDL    Cardiac WDL WDL       Peripheral/Neurovascular WDL    Peripheral Neurovascular WDL WDL       Cognitive/Neuro/Behavioral WDL    Cognitive/Neuro/Behavioral WDL WDL

## 2022-11-19 NOTE — DISCHARGE INSTRUCTIONS
Please follow-up with your primary care provider and/or specialist regarding your visit to the ER today.    Please return to the emergency department should you experience any of the symptoms we specifically discussed, including but not limited to recurrence or worsening of your symptoms, or development of any new and concerning symptoms.    Your heart rate was still a little elevated at the time of your discharge.  This is likely secondary to dehydration and influenza.  However, you should follow-up with your primary care doctor about this.

## 2022-11-19 NOTE — ED PROVIDER NOTES
History   Chief Complaint:  Chest Pain       The history is provided by the patient.      Laura Barrera is a 22 year old female who presents with a myriad of flu symptoms over the past two days. She also has been experiencing tachycardia, chest pain, shortness of breath, lightheadedness, cough, and intermittent transient numbness in her extremities bilaterally below her elbows and knees, but this has since resolved. Today, her symptoms have worsened. She mentions that she has vomited, and that deep breaths will worsen the pain. She was seen at  around 0730 today prior to coming to the ED. She is currently on birth control. No recent trips or travel. She denies blood in stool, diarrhea, dysuria, hematuria, eye pain, or visual disturbances.  No history of IV drug use.    Of note, she has received the COVID vaccine, but not the Influenza vaccine.    Review of Systems   Constitutional: Positive for chills and fever.   HENT: Positive for congestion, rhinorrhea and sore throat.    Eyes: Negative for pain and visual disturbance.   Respiratory: Positive for cough and shortness of breath.    Cardiovascular: Positive for chest pain.   Gastrointestinal: Positive for nausea and vomiting. Negative for blood in stool and diarrhea.   Genitourinary: Positive for frequency. Negative for dysuria and hematuria.   Musculoskeletal: Positive for back pain (baseline) and myalgias.   Skin: Negative for color change and pallor.   Neurological: Positive for light-headedness and numbness.   Psychiatric/Behavioral: Negative for agitation and behavioral problems.   All other systems reviewed and are negative.    Allergies:  Diazepam    Medications:  Amoxil  Zyrtec  Atarax  Medrol dosepak  Pamelor  Maxalt    Past Medical History:     Migraines    Past Surgical History:    Cholecystomy  Scoliosis surgery      Family History:    Cancer    Social History:  The patient presents to the ED accompanied.  Denies illicit drug use.  Denies smoking or  alcohol use.    Physical Exam     Patient Vitals for the past 24 hrs:   BP Temp Temp src Pulse Resp SpO2   11/19/22 1615 -- -- -- -- -- 100 %   11/19/22 1515 121/70 -- -- 112 -- 99 %   11/19/22 1400 101/54 -- -- -- -- 98 %   11/19/22 1356 101/54 -- -- 115 16 97 %   11/19/22 1329 -- -- -- 115 11 94 %   11/19/22 1259 (!) 89/47 -- -- 111 25 95 %   11/19/22 1134 -- -- -- (!) 130 30 95 %   11/19/22 1130 96/42 -- -- (!) 131 (!) 33 98 %   11/19/22 1029 114/73 (!) 100.8  F (38.2  C) Oral (!) 141 24 98 %       Physical Exam  Constitutional:       Appearance: Normal appearance.   HENT:      Head: Normocephalic and atraumatic.   Eyes:      Extraocular Movements: Extraocular movements intact.      Conjunctiva/sclera: Conjunctivae normal.   Cardiovascular:      Rate and Rhythm: Sinus tachycardia and regular rhythm.   Pulmonary:      Effort: Pulmonary effort is normal. No respiratory distress.      Breath sounds: Lear to auscultation bilaterally.  Abdominal:      General: Abdomen is flat. There is no distension.      Palpations: Abdomen is soft.      Tenderness: There is no abdominal tenderness.   Musculoskeletal:      Cervical back: Normal range of motion. No rigidity.       Right lower leg: No edema.      Left lower leg: No edema.   Skin:     General: Skin is warm and dry.   Neurological:      General: No focal deficit present.      Mental Status: Alert and oriented to person, place, and time.   Psychiatric:         Mood and Affect: Mood normal.         Behavior: Behavior normal.    Emergency Department Course   ECG  ECG taken at 1047, ECG read at 1048  Sinus tachycardia  Possible left atrial enlargement  Borderline ECG   No significant changes as compared to prior, dated 6/24/2022.  Rate 130 bpm. OK interval 124 ms. QRS duration 74 ms. QT/QTc 288/423 ms. P-R-T axes 36 46 20.     Imaging:  CT Chest Pulmonary Embolism w Contrast   Final Result   IMPRESSION:      1.  No pulmonary embolism or other acute intrathoracic abnormality.       XR Chest 2 Views   Final Result   IMPRESSION: Lung volumes are low. No airspace opacities, pleural effusions, or pneumothorax. Nonenlarged cardiomediastinal silhouette. Unchanged thoracic spinal fusion hardware and cholecystectomy clips.        Report per radiology    Laboratory:  Labs Ordered and Resulted from Time of ED Arrival to Time of ED Departure   COMPREHENSIVE METABOLIC PANEL - Abnormal       Result Value    Sodium 137      Potassium 3.7      Chloride 104      Carbon Dioxide (CO2) 17 (*)     Anion Gap 16 (*)     Urea Nitrogen 4.0 (*)     Creatinine 0.73      Calcium 10.0      Glucose 98      Alkaline Phosphatase 126 (*)     AST 87 (*)     ALT 98 (*)     Protein Total 7.6      Albumin 4.8      Bilirubin Total 0.7      GFR Estimate >90     ROUTINE UA WITH MICROSCOPIC REFLEX TO CULTURE - Abnormal    Color Urine Light Yellow      Appearance Urine Clear      Glucose Urine Negative      Bilirubin Urine Negative      Ketones Urine 20 (*)     Specific Gravity Urine 1.024      Blood Urine Trace (*)     pH Urine 6.5      Protein Albumin Urine Negative      Urobilinogen Urine Normal      Nitrite Urine Negative      Leukocyte Esterase Urine Negative      Mucus Urine Present (*)     RBC Urine 2      WBC Urine 3      Squamous Epithelials Urine 11 (*)    CRP INFLAMMATION - Abnormal    CRP Inflammation 11.02 (*)    D DIMER QUANTITATIVE - Abnormal    D-Dimer Quantitative 1.12 (*)    INFLUENZA A/B & SARS-COV2 PCR MULTIPLEX - Abnormal    Influenza A PCR Positive (*)     Influenza B PCR Negative      RSV PCR Negative      SARS CoV2 PCR Negative     CBC WITH PLATELETS AND DIFFERENTIAL - Abnormal    WBC Count 6.8      RBC Count 4.92      Hemoglobin 13.8      Hematocrit 41.9      MCV 85      MCH 28.0      MCHC 32.9      RDW 12.5      Platelet Count 245      % Neutrophils 88      % Lymphocytes 3      % Monocytes 7      % Eosinophils 0      % Basophils 1      % Immature Granulocytes 1      NRBCs per 100 WBC 0      Absolute  Neutrophils 6.0      Absolute Lymphocytes 0.2 (*)     Absolute Monocytes 0.5      Absolute Eosinophils 0.0      Absolute Basophils 0.0      Absolute Immature Granulocytes 0.0      Absolute NRBCs 0.0     ISTAT GASES LACTATE VENOUS POCT - Abnormal    Lactic Acid POCT 1.4      Bicarbonate Venous POCT 18 (*)     O2 Sat, Venous POCT 44 (*)     pCO2V Venous POCT 25 (*)     pH Venous POCT 7.47 (*)     pO2 Venous POCT 22 (*)    ISTAT GASES LACTATE VENOUS POCT - Abnormal    Lactic Acid POCT 0.8      Bicarbonate Venous POCT 20 (*)     O2 Sat, Venous POCT 58 (*)     pCO2V Venous POCT 31 (*)     pH Venous POCT 7.43      pO2 Venous POCT 29     ISTAT BASIC CHEM ICA HEMATOCRIT POCT - Abnormal    Chloride POCT 107      Potassium POCT 3.7      Sodium POCT 140      UREA NITROGEN POCT <3 (*)     Calcium, Ionized Whole Blood POCT 5.0      Glucose Whole Blood POCT 92      Anion Gap POCT 16.0 (*)     Hemoglobin POCT 12.2      Hematocrit POCT 36      Creatinine POCT 0.6      TOTAL CO2 POCT 21     TROPONIN T, HIGH SENSITIVITY - Normal    Troponin T, High Sensitivity 7     NT PROBNP INPATIENT - Normal    N terminal Pro BNP Inpatient 148     ERYTHROCYTE SEDIMENTATION RATE AUTO - Normal    Erythrocyte Sedimentation Rate 9     MAGNESIUM - Normal    Magnesium 2.0        Emergency Department Course:    Reviewed:  I reviewed nursing notes, vitals, past medical history and Care Everywhere    Assessments/Consults:    ED Course as of 11/19/22 1623   Sat Nov 19, 2022   1049 I obtained history and evaluated the patient.     1140 Lactic Acid POCT: 1.4   1140 Bicarbonate Venous POCT(!): 18   1140 WBC: 6.8   1200 XR Chest 2 Views  Lung volumes are low. No airspace opacities, pleural effusions, or pneumothorax. Nonenlarged cardiomediastinal silhouette. Unchanged thoracic spinal fusion hardware and cholecystectomy clips.   1251 D-Dimer Quantitative(!): 1.12   1251 Influenza A(!): Positive   1337 CRP Inflammation(!): 11.02   1337 Carbon Dioxide (CO2)(!): 17    1337 Anion Gap(!): 16   1337 Alkaline Phosphatase(!): 126   1337 AST(!): 87   1337 ALT(!): 98   1421 CT Chest Pulmonary Embolism w Contrast  Neg PE/pneumonia   1539 Bicarbonate Venous POCT(!): 20   1540 No desaturation during ambulating trial.  Saturating greater than 95%.  No shortness of breath or dizziness.   1615 Had extensive discussion with patient regarding lab and image findings.  Patient still having mildly tachycardic heart rate, but much improved compared to arrival vitals after 2 L IV fluids.  Patient states that she is very anxious to stay in the ER especially now that her brother left.  Patient has history of tachycardia with her anxiety at doctor's offices.  Patient states that she would like to go home at this time.  Will discharge patient to follow-up with primary care provider for repeat physical exam.  Will discharge with Zofran prescription.  Advised patient to continue Tylenol and ibuprofen as needed for symptoms.  Patient states that her symptoms have been ongoing since Wednesday/Thursday and likely outside of Tamiflu treatment benefit window.  Discussed return precautions.  Answered all questions.  Patient voiced understanding and agreement with plan.  Patient requesting ibuprofen prior to discharge.  Ibuprofen 600 mg tablet ordered.       Interventions:  1103 Tylenol 975 mg PO  1113 LR 1 L IV   Zofran 4 mg IV  1345 Iopamidol 65 ml IV   Saline 85 ml IV    Disposition:  The patient was discharged to home.     Impression & Plan     CMS Diagnoses: None    Medical Decision Makin-year-old female as described above presents to the emergency department for tachycardiac, chest pain, epigastric pain, shortness of breath, body aches, flulike symptoms, and lightheadedness x2 days.  Patient hemodynamically stable at time evaluation.  Temperature 100.8  F tachycardic at 141.  Differential diagnosis considered includes, but not limited to, acute viral syndrome, sepsis, myocarditis/pericarditis, and  pneumonia.  Cardiac/dyspnea/infectious work-up ordered.  D-dimer.  IV fluid rehydration and nausea/fever control.  Discussed care plan with patient who voiced understanding and agreement with plan.  Answered all questions.  Additional work-up and orders as listed in chart.    Please refer to ED course above for details on the patient's treatment course and any changes or updates in care plan beyond my initial evaluation and MDM.    Diagnosis:    ICD-10-CM    1. Influenza A  J10.1       2. Dehydration  E86.0           Discharge Medications:  New Prescriptions    ONDANSETRON (ZOFRAN) 4 MG TABLET    Take 1 tablet (4 mg) by mouth every 8 hours as needed for nausea       Scribe Disclosure:  Beto DIGGS, am serving as a scribe at 10:38 AM on 11/19/2022 to document services personally performed by Marv Qureshi DO based on my observations and the provider's statements to me.          Marv Qureshi DO  11/19/22 1157

## 2022-11-19 NOTE — LETTER
November 19, 2022      To Whom It May Concern:      Laura Barrera was seen in our Emergency Department today, 11/19/22.  I expect her condition to improve over the next 3-5 days.  She may return to work when fever free for 24 hours.    Sincerely,        EVELINA House

## 2022-11-21 LAB
ATRIAL RATE - MUSE: 130 BPM
DIASTOLIC BLOOD PRESSURE - MUSE: NORMAL MMHG
INTERPRETATION ECG - MUSE: NORMAL
P AXIS - MUSE: 36 DEGREES
PR INTERVAL - MUSE: 124 MS
QRS DURATION - MUSE: 74 MS
QT - MUSE: 288 MS
QTC - MUSE: 423 MS
R AXIS - MUSE: 46 DEGREES
SYSTOLIC BLOOD PRESSURE - MUSE: NORMAL MMHG
T AXIS - MUSE: 20 DEGREES
VENTRICULAR RATE- MUSE: 130 BPM

## 2023-03-23 ENCOUNTER — APPOINTMENT (OUTPATIENT)
Dept: GENERAL RADIOLOGY | Facility: CLINIC | Age: 23
End: 2023-03-23
Attending: EMERGENCY MEDICINE
Payer: COMMERCIAL

## 2023-03-23 ENCOUNTER — APPOINTMENT (OUTPATIENT)
Dept: CT IMAGING | Facility: CLINIC | Age: 23
End: 2023-03-23
Attending: PHYSICIAN ASSISTANT
Payer: COMMERCIAL

## 2023-03-23 ENCOUNTER — HOSPITAL ENCOUNTER (EMERGENCY)
Facility: CLINIC | Age: 23
Discharge: HOME | End: 2023-03-23
Attending: PHYSICIAN ASSISTANT | Admitting: PHYSICIAN ASSISTANT
Payer: COMMERCIAL

## 2023-03-23 VITALS
HEART RATE: 75 BPM | WEIGHT: 203.8 LBS | RESPIRATION RATE: 18 BRPM | BODY MASS INDEX: 32.89 KG/M2 | DIASTOLIC BLOOD PRESSURE: 64 MMHG | TEMPERATURE: 98.3 F | SYSTOLIC BLOOD PRESSURE: 137 MMHG | OXYGEN SATURATION: 98 %

## 2023-03-23 DIAGNOSIS — R20.2 NUMBNESS AND TINGLING: ICD-10-CM

## 2023-03-23 DIAGNOSIS — R07.9 CHEST PAIN, UNSPECIFIED TYPE: ICD-10-CM

## 2023-03-23 DIAGNOSIS — R20.0 NUMBNESS AND TINGLING: ICD-10-CM

## 2023-03-23 DIAGNOSIS — R00.2 PALPITATIONS: ICD-10-CM

## 2023-03-23 DIAGNOSIS — J02.9 SORE THROAT: ICD-10-CM

## 2023-03-23 LAB
ALBUMIN UR-MCNC: 10 MG/DL
AMORPH CRY #/AREA URNS HPF: ABNORMAL /HPF
ANION GAP SERPL CALCULATED.3IONS-SCNC: 12 MMOL/L (ref 7–15)
APPEARANCE UR: ABNORMAL
BASOPHILS # BLD AUTO: 0 10E3/UL (ref 0–0.2)
BASOPHILS NFR BLD AUTO: 1 %
BILIRUB UR QL STRIP: NEGATIVE
BUN SERPL-MCNC: 7.4 MG/DL (ref 6–20)
CALCIUM SERPL-MCNC: 9.6 MG/DL (ref 8.6–10)
CHLORIDE SERPL-SCNC: 106 MMOL/L (ref 98–107)
CLUE CELLS: NORMAL
COLOR UR AUTO: YELLOW
CREAT SERPL-MCNC: 0.58 MG/DL (ref 0.51–0.95)
D DIMER PPP FEU-MCNC: 1.01 UG/ML FEU (ref 0–0.5)
DEPRECATED HCO3 PLAS-SCNC: 22 MMOL/L (ref 22–29)
DEPRECATED S PYO AG THROAT QL EIA: NEGATIVE
EOSINOPHIL # BLD AUTO: 0.1 10E3/UL (ref 0–0.7)
EOSINOPHIL NFR BLD AUTO: 1 %
ERYTHROCYTE [DISTWIDTH] IN BLOOD BY AUTOMATED COUNT: 12.3 % (ref 10–15)
FLUAV RNA SPEC QL NAA+PROBE: NEGATIVE
FLUBV RNA RESP QL NAA+PROBE: NEGATIVE
GFR SERPL CREATININE-BSD FRML MDRD: >90 ML/MIN/1.73M2
GLUCOSE SERPL-MCNC: 90 MG/DL (ref 70–99)
GLUCOSE UR STRIP-MCNC: NEGATIVE MG/DL
GROUP A STREP BY PCR: NOT DETECTED
HCG UR QL: NEGATIVE
HCT VFR BLD AUTO: 43 % (ref 35–47)
HGB BLD-MCNC: 14.2 G/DL (ref 11.7–15.7)
HGB UR QL STRIP: ABNORMAL
HOLD SPECIMEN: NORMAL
HOLD SPECIMEN: NORMAL
IMM GRANULOCYTES # BLD: 0 10E3/UL
IMM GRANULOCYTES NFR BLD: 0 %
KETONES UR STRIP-MCNC: NEGATIVE MG/DL
LEUKOCYTE ESTERASE UR QL STRIP: NEGATIVE
LYMPHOCYTES # BLD AUTO: 2.3 10E3/UL (ref 0.8–5.3)
LYMPHOCYTES NFR BLD AUTO: 34 %
MAGNESIUM SERPL-MCNC: 2.2 MG/DL (ref 1.7–2.3)
MCH RBC QN AUTO: 28.5 PG (ref 26.5–33)
MCHC RBC AUTO-ENTMCNC: 33 G/DL (ref 31.5–36.5)
MCV RBC AUTO: 86 FL (ref 78–100)
MONOCYTES # BLD AUTO: 0.7 10E3/UL (ref 0–1.3)
MONOCYTES NFR BLD AUTO: 9 %
MUCOUS THREADS #/AREA URNS LPF: PRESENT /LPF
NEUTROPHILS # BLD AUTO: 3.8 10E3/UL (ref 1.6–8.3)
NEUTROPHILS NFR BLD AUTO: 55 %
NITRATE UR QL: NEGATIVE
NRBC # BLD AUTO: 0 10E3/UL
NRBC BLD AUTO-RTO: 0 /100
PH UR STRIP: 7 [PH] (ref 5–7)
PLATELET # BLD AUTO: 289 10E3/UL (ref 150–450)
POTASSIUM SERPL-SCNC: 3.8 MMOL/L (ref 3.4–5.3)
RBC # BLD AUTO: 4.99 10E6/UL (ref 3.8–5.2)
RBC URINE: 0 /HPF
RSV RNA SPEC NAA+PROBE: NEGATIVE
SARS-COV-2 RNA RESP QL NAA+PROBE: NEGATIVE
SODIUM SERPL-SCNC: 140 MMOL/L (ref 136–145)
SP GR UR STRIP: 1.02 (ref 1–1.03)
SQUAMOUS EPITHELIAL: 1 /HPF
TRICHOMONAS, WET PREP: NORMAL
TROPONIN T SERPL HS-MCNC: <6 NG/L
TSH SERPL DL<=0.005 MIU/L-ACNC: 2.3 UIU/ML (ref 0.3–4.2)
UROBILINOGEN UR STRIP-MCNC: NORMAL MG/DL
WBC # BLD AUTO: 6.9 10E3/UL (ref 4–11)
WBC URINE: 6 /HPF
WBC'S/HIGH POWER FIELD, WET PREP: NORMAL
YEAST, WET PREP: NORMAL

## 2023-03-23 PROCEDURE — 85025 COMPLETE CBC W/AUTO DIFF WBC: CPT | Performed by: EMERGENCY MEDICINE

## 2023-03-23 PROCEDURE — 81001 URINALYSIS AUTO W/SCOPE: CPT | Performed by: EMERGENCY MEDICINE

## 2023-03-23 PROCEDURE — 87637 SARSCOV2&INF A&B&RSV AMP PRB: CPT | Performed by: EMERGENCY MEDICINE

## 2023-03-23 PROCEDURE — 71046 X-RAY EXAM CHEST 2 VIEWS: CPT

## 2023-03-23 PROCEDURE — 84484 ASSAY OF TROPONIN QUANT: CPT | Performed by: EMERGENCY MEDICINE

## 2023-03-23 PROCEDURE — 71275 CT ANGIOGRAPHY CHEST: CPT

## 2023-03-23 PROCEDURE — 87210 SMEAR WET MOUNT SALINE/INK: CPT | Performed by: EMERGENCY MEDICINE

## 2023-03-23 PROCEDURE — C9803 HOPD COVID-19 SPEC COLLECT: HCPCS

## 2023-03-23 PROCEDURE — 93005 ELECTROCARDIOGRAM TRACING: CPT

## 2023-03-23 PROCEDURE — 87210 SMEAR WET MOUNT SALINE/INK: CPT | Performed by: PHYSICIAN ASSISTANT

## 2023-03-23 PROCEDURE — 99285 EMERGENCY DEPT VISIT HI MDM: CPT | Mod: 25,CS

## 2023-03-23 PROCEDURE — 87651 STREP A DNA AMP PROBE: CPT | Performed by: PHYSICIAN ASSISTANT

## 2023-03-23 PROCEDURE — 96374 THER/PROPH/DIAG INJ IV PUSH: CPT | Mod: 59

## 2023-03-23 PROCEDURE — 250N000011 HC RX IP 250 OP 636: Performed by: PHYSICIAN ASSISTANT

## 2023-03-23 PROCEDURE — 82310 ASSAY OF CALCIUM: CPT | Performed by: EMERGENCY MEDICINE

## 2023-03-23 PROCEDURE — 250N000011 HC RX IP 250 OP 636: Performed by: EMERGENCY MEDICINE

## 2023-03-23 PROCEDURE — 84443 ASSAY THYROID STIM HORMONE: CPT | Performed by: PHYSICIAN ASSISTANT

## 2023-03-23 PROCEDURE — 81025 URINE PREGNANCY TEST: CPT | Performed by: PHYSICIAN ASSISTANT

## 2023-03-23 PROCEDURE — 87637 SARSCOV2&INF A&B&RSV AMP PRB: CPT | Performed by: PHYSICIAN ASSISTANT

## 2023-03-23 PROCEDURE — 81001 URINALYSIS AUTO W/SCOPE: CPT | Performed by: PHYSICIAN ASSISTANT

## 2023-03-23 PROCEDURE — 85379 FIBRIN DEGRADATION QUANT: CPT | Performed by: PHYSICIAN ASSISTANT

## 2023-03-23 PROCEDURE — 250N000009 HC RX 250: Performed by: PHYSICIAN ASSISTANT

## 2023-03-23 PROCEDURE — 83735 ASSAY OF MAGNESIUM: CPT | Performed by: PHYSICIAN ASSISTANT

## 2023-03-23 PROCEDURE — 250N000013 HC RX MED GY IP 250 OP 250 PS 637: Performed by: EMERGENCY MEDICINE

## 2023-03-23 PROCEDURE — 36415 COLL VENOUS BLD VENIPUNCTURE: CPT | Performed by: EMERGENCY MEDICINE

## 2023-03-23 RX ORDER — ACETAMINOPHEN 500 MG
1000 TABLET ORAL ONCE
Status: COMPLETED | OUTPATIENT
Start: 2023-03-23 | End: 2023-03-23

## 2023-03-23 RX ORDER — IOPAMIDOL 755 MG/ML
500 INJECTION, SOLUTION INTRAVASCULAR ONCE
Status: COMPLETED | OUTPATIENT
Start: 2023-03-23 | End: 2023-03-23

## 2023-03-23 RX ORDER — KETOROLAC TROMETHAMINE 15 MG/ML
15 INJECTION, SOLUTION INTRAMUSCULAR; INTRAVENOUS ONCE
Status: COMPLETED | OUTPATIENT
Start: 2023-03-23 | End: 2023-03-23

## 2023-03-23 RX ADMIN — ACETAMINOPHEN 1000 MG: 500 TABLET ORAL at 18:12

## 2023-03-23 RX ADMIN — SODIUM CHLORIDE 86 ML: 9 INJECTION, SOLUTION INTRAVENOUS at 20:55

## 2023-03-23 RX ADMIN — KETOROLAC TROMETHAMINE 15 MG: 15 INJECTION, SOLUTION INTRAMUSCULAR; INTRAVENOUS at 18:16

## 2023-03-23 RX ADMIN — IOPAMIDOL 68 ML: 755 INJECTION, SOLUTION INTRAVENOUS at 20:55

## 2023-03-23 ASSESSMENT — ACTIVITIES OF DAILY LIVING (ADL)
ADLS_ACUITY_SCORE: 35
ADLS_ACUITY_SCORE: 35

## 2023-03-23 NOTE — ED TRIAGE NOTES
Migraine started yesterday  Pain spreading to right side of face and right temple  Sharp chest pain began yesterday afternoon, worsening today  Palpitations  Numbness and tingling in bilateral upper and lower extremities x1 week  Dx with BV 1 month ago at [facility], but was not given any prescriptions. Symptoms still present.   Sneezing, runny nose, and sore throat started yesterday  Bilateral ear pressure began this morning   Triage Assessment     Row Name 03/23/23 6767       Triage Assessment (Adult)    Airway WDL WDL       Cognitive/Neuro/Behavioral WDL    Cognitive/Neuro/Behavioral WDL WDL

## 2023-03-24 LAB
ATRIAL RATE - MUSE: 76 BPM
DIASTOLIC BLOOD PRESSURE - MUSE: NORMAL MMHG
INTERPRETATION ECG - MUSE: NORMAL
P AXIS - MUSE: 36 DEGREES
PR INTERVAL - MUSE: 140 MS
QRS DURATION - MUSE: 82 MS
QT - MUSE: 370 MS
QTC - MUSE: 416 MS
R AXIS - MUSE: 26 DEGREES
SYSTOLIC BLOOD PRESSURE - MUSE: NORMAL MMHG
T AXIS - MUSE: 21 DEGREES
VENTRICULAR RATE- MUSE: 76 BPM

## 2023-03-24 NOTE — ED PROVIDER NOTES
History     Chief Complaint:  Multiple Complaints       HPI   Laura Barrera is a 22 year old female with a history of anxiety who presents with multiple complaints. She explains that for the past three days she has had intermittent sharp chest pains that last 2-3 seconds that do not radiate and are exacerbated by walking.  She adds she is also been having headache which is not not thunderclap in nature and feels like it may be a migraine as she has had migraines in the past.  The only difference from this headache is that she normally gets bilateral pain and this is more located behind her right eye however denies any vision changes or vomiting.  She reports intensity of the pain and the nature of the pain are similar to previous migraines.  Over the same time period she has begun noticing tingling and numbness in her fingers. Last night she developed a cough and a sore throat. She reports that all these symptoms are new and not consistent with past diagnoses.  However I do know on 11/19/2022 she was diagnosed with influenza and complained of very similar symptoms reporting chest pain, intermittent transient numbness of her extremities, headache, shortness of breath, lightheadedness.  She reports right now her symptoms have improved and she is not having chest pain or tingling numbness. She denies vision changes, vomiting, syncope, abdominal pain, neck pain, or fever. She denies history of heart problems or blood clots.  Patient was also concerned about a potential BV diagnosis she had about a month ago and requested testing for this up at nursing triage.  She is currently not having any vaginal bleeding or discomfort.    Patient denies any family history of process or cardiac disease.  She is not diabetic, hypertension, diabetes, or high cholesterol.  Is a non-smoker.  Patient denies any history of blood clots, recent surgeries, active cancer or recent immobilizations.  She is on birth control with  Depo-Provera.    Independent Historian:   None - Patient Only    Review of External Notes:     ROS:  Review of Systems   See HPI    Allergies:  Diazepam     Medications:    Amoxil  Atarax  Medrol Dosepak  Zeina Novat  Depo-provera    Past Medical History:    Scoliosis  Migraine    Past Surgical History:    Cholecystostomy  Spine surgery     Family History:    Mother: Cancer    Social History:  Patient is unaccompanied in the ED.  PCP: No Ref-Primary, Physician     Physical Exam     Patient Vitals for the past 24 hrs:   BP Temp Temp src Pulse Resp SpO2 Weight   03/23/23 2140 137/64 -- -- 75 -- 98 % --   03/23/23 1712 (!) 142/96 98.3  F (36.8  C) Temporal 90 18 99 % --   03/23/23 1708 -- -- -- -- -- -- 92.4 kg (203 lb 12.8 oz)        Physical Exam  Vitals and nursing note reviewed.   Constitutional:       Appearance: She is not ill-appearing or diaphoretic.   HENT:      Head: Atraumatic.      Right Ear: Tympanic membrane, ear canal and external ear normal.      Left Ear: Tympanic membrane, ear canal and external ear normal.      Nose: Nose normal. No congestion.      Mouth/Throat:      Lips: Pink.      Mouth: Mucous membranes are moist.      Pharynx: Oropharynx is clear. Uvula midline. No pharyngeal swelling, oropharyngeal exudate, posterior oropharyngeal erythema or uvula swelling.      Tonsils: No tonsillar exudate. 0 on the right. 0 on the left.   Eyes:      General: No scleral icterus.        Right eye: No discharge.         Left eye: No discharge.      Extraocular Movements: Extraocular movements intact.      Conjunctiva/sclera: Conjunctivae normal.      Pupils: Pupils are equal, round, and reactive to light.   Cardiovascular:      Rate and Rhythm: Normal rate and regular rhythm.      Heart sounds: Normal heart sounds. No murmur heard.    No friction rub. No gallop.   Pulmonary:      Effort: Pulmonary effort is normal. No respiratory distress.      Breath sounds: Normal breath sounds. No stridor. No  wheezing, rhonchi or rales.   Chest:      Chest wall: No tenderness.   Abdominal:      Palpations: Abdomen is soft.      Tenderness: There is no abdominal tenderness. There is no right CVA tenderness, left CVA tenderness or rebound.   Musculoskeletal:         General: No swelling or tenderness.      Cervical back: Normal range of motion and neck supple. No tenderness.      Right lower leg: No edema.      Left lower leg: No edema.   Lymphadenopathy:      Cervical: No cervical adenopathy.   Skin:     Coloration: Skin is not jaundiced or pale.      Findings: No rash.   Neurological:      Mental Status: She is alert and oriented to person, place, and time. Mental status is at baseline.      GCS: GCS eye subscore is 4. GCS verbal subscore is 5. GCS motor subscore is 6.      Cranial Nerves: No cranial nerve deficit.      Motor: No weakness or pronator drift.      Coordination: Romberg sign negative. Coordination normal. Finger-Nose-Finger Test normal.      Gait: Gait is intact. Gait normal.   Psychiatric:         Mood and Affect: Mood normal.         Behavior: Behavior normal.         Thought Content: Thought content normal.           Emergency Department Course   ECG  ECG taken at 1720, ECG read at 1929  Normal sinus rhythm  Normal ECG  Rate 76 bpm. NC interval 140 ms. QRS duration 82 ms. QT/QTc 370/416 ms. P-R-T axes 36 26 21.       Imaging:  CT Chest Pulmonary Embolism w Contrast   Final Result   IMPRESSION:   1.  No pulmonary artery embolism or thoracic aortic dissection.      Chest XR,  PA & LAT   Final Result   IMPRESSION: Heart size and vascularity are normal. Lungs are clear with no pneumothorax or pleural effusion. Thoracolumbar spinal fusion hardware unchanged.      Leadless EKG Monitor 3 to 7 Days    (Results Pending)      Report per radiology    Laboratory:  Labs Ordered and Resulted from Time of ED Arrival to Time of ED Departure   ROUTINE UA WITH MICROSCOPIC - Abnormal       Result Value    Color Urine  Yellow      Appearance Urine Slightly Cloudy (*)     Glucose Urine Negative      Bilirubin Urine Negative      Ketones Urine Negative      Specific Gravity Urine 1.023      Blood Urine Trace (*)     pH Urine 7.0      Protein Albumin Urine 10 (*)     Urobilinogen Urine Normal      Nitrite Urine Negative      Leukocyte Esterase Urine Negative      Mucus Urine Present (*)     Amorphous Crystals Urine Few (*)     RBC Urine 0      WBC Urine 6 (*)     Squamous Epithelials Urine 1     D DIMER QUANTITATIVE - Abnormal    D-Dimer Quantitative 1.01 (*)    INFLUENZA A/B, RSV, & SARS-COV2 PCR - Normal    Influenza A PCR Negative      Influenza B PCR Negative      RSV PCR Negative      SARS CoV2 PCR Negative     BASIC METABOLIC PANEL - Normal    Sodium 140      Potassium 3.8      Chloride 106      Carbon Dioxide (CO2) 22      Anion Gap 12      Urea Nitrogen 7.4      Creatinine 0.58      Calcium 9.6      Glucose 90      GFR Estimate >90     TROPONIN T, HIGH SENSITIVITY - Normal    Troponin T, High Sensitivity <6     TSH WITH FREE T4 REFLEX - Normal    TSH 2.30     MAGNESIUM - Normal    Magnesium 2.2     HCG QUALITATIVE URINE - Normal    hCG Urine Qualitative Negative     WET PREPARATION - Normal    Trichomonas Absent      Yeast Absent      Clue Cells Absent      WBCs/high power field None     STREPTOCOCCUS A RAPID SCREEN W REFELX TO PCR - Normal    Group A Strep antigen Negative     CBC WITH PLATELETS AND DIFFERENTIAL    WBC Count 6.9      RBC Count 4.99      Hemoglobin 14.2      Hematocrit 43.0      MCV 86      MCH 28.5      MCHC 33.0      RDW 12.3      Platelet Count 289      % Neutrophils 55      % Lymphocytes 34      % Monocytes 9      % Eosinophils 1      % Basophils 1      % Immature Granulocytes 0      NRBCs per 100 WBC 0      Absolute Neutrophils 3.8      Absolute Lymphocytes 2.3      Absolute Monocytes 0.7      Absolute Eosinophils 0.1      Absolute Basophils 0.0      Absolute Immature Granulocytes 0.0      Absolute NRBCs  0.0        Emergency Department Course & Assessments:    Interventions:  Medications   ketorolac (TORADOL) injection 15 mg (15 mg Intravenous $Given 3/23/23 1816)   acetaminophen (TYLENOL) tablet 1,000 mg (1,000 mg Oral $Given 3/23/23 1812)   CT Scan Flush (86 mLs Intravenous $Given 3/23/23 2055)   iopamidol (ISOVUE-370) solution 500 mL (68 mLs Intravenous $Given 3/23/23 2055)        Assessments:  Patient was assessed and interviewed.  Patient was given results of labs and due to elevated D-dimer proceeded with CT imaging of her chest.  Patient's results and CT imaging were discussed.  Patient's pain has improved with Toradol.    Independent Interpretation (X-rays, CTs, rhythm strip):  Chest x-ray: No pneumothorax or pneumonia.  Normal mediastinum.    Consultations/Discussion of Management or Tests:  1918 I obtained history and examined the patient as noted above.  2001 I rechecked the patient and explained findings.    Social Determinants of Health affecting care:   None    Disposition:  The patient was discharged to home.     Impression & Plan      Medical Decision Making:  This is a 22-year-old female that presents with multiple complaints.  These complaints included chest pain, numbness and tingling, headache, sore throat and cough, concerns regarding BV infection.    I considered a broad differential of etiologies for her symptoms.  These include neurologic causes such as subarachnoid hemorrhage, stroke, intracranial lesion, arterial dissections, among many others.  At this time based on her history present illness and clinical exam findings I do feel as her headaches are likely related to multifactorial causes such as active viral infection likely triggering migraine headache.  I do not feel she requires any imaging of her head or neck at this time.  Her symptoms did resolve with medication and fluids.      Regarding the patient's chest pain broad differential was considered including ACS, PE, pneumothorax,  pneumonia, aortic dissection, Boerhaave syndrome, among other etiologies.  At this time CT imaging of her chest was negative for PE and and/or aortic dissection.  This was obtained due to elevated D-dimer.  No evidence of ischemia or ACS with negative troponin and normal ECG.  Based on the length of time she has been having her symptoms and the nature of her pain I think unstable angina is unlikely.  Patient's symptoms are not consistent with Boerhaave syndrome.  There is no evidence of pneumonia or pneumothorax on chest CT or chest x-ray.  Given her concerns regarding palpitations I have placed referral for leadless EKG and primary care referral for follow-up.    Regarding the patient's transient paresthesias.  The paresthesias are in a broad distribution and nonfocal.  There is no evidence to suggest central lesion at this time.  And she is not currently having any active numbness tingling.  I think there is a component of anxiety that may be related to the symptoms.    Patient's wet prep was negative for BV.  She is not actively having symptoms.  GC and Chlamydia testing were discontinued by lab.  I offered to repeat check this if you would like but the patient declined.    At this time overall I think likely she has underlying viral illness which is likely triggering her migraines.  I think there is also a component of anxiety causing some other somatic form complaints.  Due to concerns regarding palpitations I think is reasonable to do cardiac monitoring with close primary care follow-up.  Referrals were placed and ordered.  She can return back to the emergency department if  further concerning symptoms such as abdominal pain, worsening breathing, high fever, worsening headache, vision changes, or overall worsening condition.      Diagnosis:    ICD-10-CM    1. Chest pain, unspecified type  R07.9       2. Sore throat  J02.9       3. Palpitations  R00.2 Primary Care Referral     Leadless EKG Monitor 3 to 7 Days       4. Numbness and tingling  R20.0     R20.2          Scribe Disclosure:  I, EMMA LEIVA, am serving as a scribe at 9:36 PM on 3/23/2023 to document services personally performed by Shayne Arellano, DEVON based on my observations and the provider's statements to me.      Shayne Arellano PA-C  03/24/23 4401

## 2023-03-29 ENCOUNTER — HOSPITAL ENCOUNTER (OUTPATIENT)
Dept: CARDIOLOGY | Facility: CLINIC | Age: 23
Discharge: HOME | End: 2023-03-29
Attending: PHYSICIAN ASSISTANT | Admitting: PHYSICIAN ASSISTANT
Payer: COMMERCIAL

## 2023-03-29 DIAGNOSIS — R00.2 PALPITATIONS: ICD-10-CM

## 2023-03-29 PROCEDURE — 93242 EXT ECG>48HR<7D RECORDING: CPT

## 2023-03-29 PROCEDURE — 93248 EXT ECG>7D<15D REV&INTERPJ: CPT | Performed by: INTERNAL MEDICINE

## 2023-04-04 ENCOUNTER — OFFICE VISIT (OUTPATIENT)
Dept: PEDIATRICS | Facility: CLINIC | Age: 23
End: 2023-04-04
Payer: COMMERCIAL

## 2023-04-04 ENCOUNTER — TELEPHONE (OUTPATIENT)
Dept: PEDIATRICS | Facility: CLINIC | Age: 23
End: 2023-04-04

## 2023-04-04 VITALS
SYSTOLIC BLOOD PRESSURE: 122 MMHG | TEMPERATURE: 98.8 F | HEIGHT: 66 IN | HEART RATE: 90 BPM | WEIGHT: 203.7 LBS | DIASTOLIC BLOOD PRESSURE: 74 MMHG | BODY MASS INDEX: 32.74 KG/M2 | RESPIRATION RATE: 16 BRPM

## 2023-04-04 DIAGNOSIS — R00.2 PALPITATIONS: ICD-10-CM

## 2023-04-04 DIAGNOSIS — Z83.3 FAMILY HISTORY OF DIABETES MELLITUS: ICD-10-CM

## 2023-04-04 DIAGNOSIS — Z72.51 UNPROTECTED SEX: ICD-10-CM

## 2023-04-04 DIAGNOSIS — Z11.3 SCREENING FOR STDS (SEXUALLY TRANSMITTED DISEASES): ICD-10-CM

## 2023-04-04 DIAGNOSIS — Z11.59 NEED FOR HEPATITIS C SCREENING TEST: ICD-10-CM

## 2023-04-04 DIAGNOSIS — F32.0 CURRENT MILD EPISODE OF MAJOR DEPRESSIVE DISORDER WITHOUT PRIOR EPISODE (H): ICD-10-CM

## 2023-04-04 DIAGNOSIS — Z09 HOSPITAL DISCHARGE FOLLOW-UP: Primary | ICD-10-CM

## 2023-04-04 DIAGNOSIS — Z11.4 SCREENING FOR HIV (HUMAN IMMUNODEFICIENCY VIRUS): ICD-10-CM

## 2023-04-04 DIAGNOSIS — F41.1 GAD (GENERALIZED ANXIETY DISORDER): ICD-10-CM

## 2023-04-04 LAB — HBA1C MFR BLD: 5.4 % (ref 0–5.6)

## 2023-04-04 PROCEDURE — 87491 CHLMYD TRACH DNA AMP PROBE: CPT | Performed by: INTERNAL MEDICINE

## 2023-04-04 PROCEDURE — 83036 HEMOGLOBIN GLYCOSYLATED A1C: CPT | Performed by: INTERNAL MEDICINE

## 2023-04-04 PROCEDURE — 36415 COLL VENOUS BLD VENIPUNCTURE: CPT | Performed by: INTERNAL MEDICINE

## 2023-04-04 PROCEDURE — 87389 HIV-1 AG W/HIV-1&-2 AB AG IA: CPT | Performed by: INTERNAL MEDICINE

## 2023-04-04 PROCEDURE — 99214 OFFICE O/P EST MOD 30 MIN: CPT | Performed by: INTERNAL MEDICINE

## 2023-04-04 PROCEDURE — 87591 N.GONORRHOEAE DNA AMP PROB: CPT | Performed by: INTERNAL MEDICINE

## 2023-04-04 PROCEDURE — 86803 HEPATITIS C AB TEST: CPT | Performed by: INTERNAL MEDICINE

## 2023-04-04 PROCEDURE — 84702 CHORIONIC GONADOTROPIN TEST: CPT | Performed by: INTERNAL MEDICINE

## 2023-04-04 ASSESSMENT — PATIENT HEALTH QUESTIONNAIRE - PHQ9
SUM OF ALL RESPONSES TO PHQ QUESTIONS 1-9: 14
SUM OF ALL RESPONSES TO PHQ QUESTIONS 1-9: 14
10. IF YOU CHECKED OFF ANY PROBLEMS, HOW DIFFICULT HAVE THESE PROBLEMS MADE IT FOR YOU TO DO YOUR WORK, TAKE CARE OF THINGS AT HOME, OR GET ALONG WITH OTHER PEOPLE: SOMEWHAT DIFFICULT

## 2023-04-04 ASSESSMENT — ENCOUNTER SYMPTOMS
WEAKNESS: 1
HEADACHES: 1

## 2023-04-04 ASSESSMENT — PAIN SCALES - GENERAL: PAINLEVEL: EXTREME PAIN (9)

## 2023-04-04 NOTE — LETTER
April 7, 2023      Laura Barrera  [address] [place] [address]        Dear ,    We are writing to inform you of your test results.  Your lab results are normal.  At this time, I do not recommend any changes to your current plan of care.     Please feel free to call with any questions.  Otherwise, we can discuss further at your next appointment.     Resulted Orders   NEISSERIA GONORRHOEA PCR   Result Value Ref Range    Neisseria gonorrhoeae Negative Negative      Comment:      Negative for N. gonorrhoeae rRNA by transcription mediated amplification. A negative result by transcription mediated amplification does not preclude the presence of C. trachomatis infection because results are dependent on proper and adequate collection, absence of inhibitors and sufficient rRNA to be detected.   CHLAMYDIA TRACHOMATIS PCR   Result Value Ref Range    Chlamydia trachomatis Negative Negative      Comment:      A negative result by transcription mediated amplification does not preclude the presence of C. trachomatis infection because results are dependent on proper and adequate collection, absence of inhibitors and sufficient rRNA to be detected.   HIV Antigen Antibody Combo   Result Value Ref Range    HIV Antigen Antibody Combo Nonreactive Nonreactive      Comment:      HIV-1 p24 Ag & HIV-1/HIV-2 Ab Not Detected   Hepatitis C Screen Reflex to HCV RNA Quant and Genotype   Result Value Ref Range    Hepatitis C Antibody Nonreactive Nonreactive    Narrative    Assay performance characteristics have not been established for newborns, infants, and children.   Hemoglobin A1c   Result Value Ref Range    Hemoglobin A1C 5.4 0.0 - 5.6 %      Comment:      Normal <5.7%   Prediabetes 5.7-6.4%    Diabetes 6.5% or higher     Note: Adopted from ADA consensus guidelines.   hCG Quantitative Pregnancy   Result Value Ref Range    hCG Quantitative <1 <5 mIU/mL      Comment:      Adult: 0-5 mIU/mL for healthy non-pregnant  person  Neonates: Should be within normal ranges by 2 days after birth         If you have any questions or concerns, please call the clinic at the number listed above.       Sincerely,      Holly Mercado MD

## 2023-04-04 NOTE — Clinical Note
Needs mental health f/up appt (video if possible) with extender or covering provider in 4 weeks (around early May)   Schedule physical with me in 5-6 months

## 2023-04-04 NOTE — PROGRESS NOTES
"  Assessment & Plan       ICD-10-CM    1. ED discharge follow-up  Z09       2. Palpitations   - reason for ED visit - note appreciated.  Suspect this is related to anxiety, which we discussed extensively. R00.2       3. HUMBERTO (generalized anxiety disorder)   - open to tx with medications and psychothearpy F41.1 Adult Mental Health  Referral     sertraline (ZOLOFT) 50 MG tablet      4. Current mild episode of major depressive disorder without prior episode (H)   - anxiety is worse than depression, but has both.  See PI F32.0 Adult Mental Health  Referral     sertraline (ZOLOFT) 50 MG tablet      5. Screening for STDs (sexually transmitted diseases)  Z11.3 NEISSERIA GONORRHOEA PCR     CHLAMYDIA TRACHOMATIS PCR     NEISSERIA GONORRHOEA PCR     CHLAMYDIA TRACHOMATIS PCR      6. Screening for HIV (human immunodeficiency virus)  Z11.4 HIV Antigen Antibody Combo     HIV Antigen Antibody Combo      7. Need for hepatitis C screening test  Z11.59 Hepatitis C Screen Reflex to HCV RNA Quant and Genotype     Hepatitis C Screen Reflex to HCV RNA Quant and Genotype      8. Family history of diabetes mellitus  Z83.3 Hemoglobin A1c     Hemoglobin A1c      9. Unprotected sex  Z72.51 hCG Quantitative Pregnancy     hCG Quantitative Pregnancy              I spent a total of 35 minutes on the day of the visit.   Time spent by me doing chart review, history and exam, documentation and further activities per the note     MED REC REQUIRED  Post Medication Reconciliation Status:  Discharge medications reconciled and changed, see notes/orders    BMI:   Estimated body mass index is 32.88 kg/m  as calculated from the following:    Height as of this encounter: 1.676 m (5' 6\").    Weight as of this encounter: 92.4 kg (203 lb 11.2 oz).       Depression Screening Follow Up        4/4/2023     3:02 PM   PHQ   PHQ-9 Total Score 14   Q9: Thoughts of better off dead/self-harm past 2 weeks Not at all         4/4/2023     3:02 PM   Last " PHQ-9   1.  Little interest or pleasure in doing things 2   2.  Feeling down, depressed, or hopeless 1   3.  Trouble falling or staying asleep, or sleeping too much 2   4.  Feeling tired or having little energy 2   5.  Poor appetite or overeating 2   6.  Feeling bad about yourself 2   7.  Trouble concentrating 1   8.  Moving slowly or restless 2   Q9: Thoughts of better off dead/self-harm past 2 weeks 0   PHQ-9 Total Score 14        Establish care  Tested for diabetes  Anxiety and depression - therapist  Palpitations - ziopatch    Anxiety - has a hx of trauma  - Went to therapy when she was [age].  - Therapist moved  - Helpful  - Increased stressed with family stressors- resurfacing memories.  - Depressed last night - couldn't stop crying  - Feels like this causes some chest pain too    Physical symptoms of anxiety  Trouble sleeping  Depressive symptoms - low mood, low energy, overthinking, mood swings  Anhedonia          Follow Up Actions Taken  Patient counseled, no additional follow up at this time.  Mental Health Referral placed     Patient Instructions   Instructions for antidepressant initiation:    Begin medications and increase dose per instructions (see below).  Monitor for side effects and increase dose only as tolerated.  Referral for therapy provided today.    If all is well, follow-up in clinic in 4-8 weeks.  Make a sooner appointment if symptoms worsen or if side effects are intolerable.        Amy Mercado MD  [facility] BRENT Sanchez is a 22 year old, presenting for the following health issues:  Hospital F/U (ER f/unit(s)- x 3 weeks ago. At [facility] for chest pains, patient is on a heart monitor. /) and Letter Request (Requesting a doctors letter for work)        4/4/2023     3:13 PM   Additional Questions   Roomed by JULIET Lucio   Accompanied by Self     Pain  This is a recurrent problem. The current episode started more than 1 month ago. The  "problem occurs constantly. The problem has been gradually improving. Associated symptoms include headaches and weakness. The symptoms are aggravated by walking and stress. She has tried nothing for the symptoms. The treatment provided no relief.      Answers for HPI/ROS submitted by the patient on 4/4/2023  If you checked off any problems, how difficult have these problems made it for you to do your work, take care of things at home, or get along with other people?: Somewhat difficult  PHQ9 TOTAL SCORE: 14          Hospital Follow-up Visit:    Hospital/Nursing Home/IP Rehab Facility: [facility]  Date of Admission: 03/23/2023  Date of Discharge: 03/23/2023  Reason(s) for Admission: chest pain and other multiple concerns     Was your hospitalization related to COVID-19? No   Problems taking medications regularly:  None  Medication changes since discharge: None   Problems adhering to non-medication therapy:  None    Summary of hospitalization:  [facility] discharge summary reviewed  Diagnostic Tests/Treatments reviewed.  Follow up needed: none  Other Healthcare Providers Involved in Patient s Care:         None  Update since discharge: improved.   Plan of care communicated with patient               Review of Systems   Neurological: Positive for weakness and headaches.      All other systems on a 10-point review are negative, unless otherwise noted in HPI        Objective    /74   Pulse 90   Temp 98.8  F (37.1  C) (Oral)   Resp 16   Ht 1.676 m (5' 6\")   Wt 92.4 kg (203 lb 11.2 oz)   PF 98 L/min   BMI 32.88 kg/m    Body mass index is 32.88 kg/m .  Physical Exam   GENERAL: healthy, alert and no distress  EYES: Eyes grossly normal to inspection  NECK: no asymmetry, masses, or scars    MENTAL STATUS EXAM:  Appearance/Behavior: No apparent distress  Speech: Normal  Mood/Affect: depressed affect  Insight: Adequate    NEURO: mentation intact and speech normal  MS: no gross " musculoskeletal defects noted, no edema  SKIN: no suspicious lesions or rashes

## 2023-04-04 NOTE — PATIENT INSTRUCTIONS
Instructions for antidepressant initiation:    Begin medications and increase dose per instructions (see below).  Monitor for side effects and increase dose only as tolerated.  Referral for therapy provided today.    If all is well, follow-up in clinic in 4-8 weeks.  Make a sooner appointment if symptoms worsen or if side effects are intolerable.

## 2023-04-04 NOTE — TELEPHONE ENCOUNTER
Called to scheduled patient for follow up.   LVM for patient to contact clinic.       Khadijah Kim MA

## 2023-04-04 NOTE — TELEPHONE ENCOUNTER
----- Message from Khadijah Kim MA sent at 4/4/2023  5:08 PM CDT -----  Regarding: Scheduling  Hello,     Patient has not been scheduled for a 4 week follow up (in person or virtual) per Dr. Mercado's request.  Please call and have her schedule for a med follow up within the next 4 weeks then also a physical in the next 5-6 months.     Thank you,    JULIET Bartholomew  Pronouns: She/Her/Hers  Patient Care Staff (PCS)  [facility]  [phone]

## 2023-04-05 LAB
C TRACH DNA SPEC QL NAA+PROBE: NEGATIVE
HCG INTACT+B SERPL-ACNC: <1 MIU/ML
HCV AB SERPL QL IA: NONREACTIVE
HIV 1+2 AB+HIV1 P24 AG SERPL QL IA: NONREACTIVE
N GONORRHOEA DNA SPEC QL NAA+PROBE: NEGATIVE

## 2023-04-06 NOTE — RESULT ENCOUNTER NOTE
Attempted to call and reach out to patient about lab results and scheduling but patient did not answer. A voice message was left for patient to contact clinic.       Khadijah Kim MA

## 2023-04-14 ENCOUNTER — TELEPHONE (OUTPATIENT)
Dept: PEDIATRICS | Facility: CLINIC | Age: 23
End: 2023-04-14
Payer: COMMERCIAL

## 2023-04-30 ENCOUNTER — HEALTH MAINTENANCE LETTER (OUTPATIENT)
Age: 23
End: 2023-04-30

## 2023-05-02 ENCOUNTER — VIRTUAL VISIT (OUTPATIENT)
Dept: PEDIATRICS | Facility: CLINIC | Age: 23
End: 2023-05-02
Payer: COMMERCIAL

## 2023-05-02 DIAGNOSIS — F41.1 GAD (GENERALIZED ANXIETY DISORDER): Primary | ICD-10-CM

## 2023-05-02 DIAGNOSIS — F32.0 CURRENT MILD EPISODE OF MAJOR DEPRESSIVE DISORDER WITHOUT PRIOR EPISODE (H): ICD-10-CM

## 2023-05-02 PROCEDURE — 99214 OFFICE O/P EST MOD 30 MIN: CPT | Mod: VID | Performed by: PHYSICIAN ASSISTANT

## 2023-05-02 PROCEDURE — 96127 BRIEF EMOTIONAL/BEHAV ASSMT: CPT | Mod: VID | Performed by: PHYSICIAN ASSISTANT

## 2023-05-02 RX ORDER — MEDROXYPROGESTERONE ACETATE 150 MG/ML
150 INJECTION, SUSPENSION INTRAMUSCULAR
COMMUNITY
Start: 2022-08-19

## 2023-05-02 ASSESSMENT — ANXIETY QUESTIONNAIRES
7. FEELING AFRAID AS IF SOMETHING AWFUL MIGHT HAPPEN: SEVERAL DAYS
4. TROUBLE RELAXING: SEVERAL DAYS
GAD7 TOTAL SCORE: 11
7. FEELING AFRAID AS IF SOMETHING AWFUL MIGHT HAPPEN: SEVERAL DAYS
6. BECOMING EASILY ANNOYED OR IRRITABLE: NEARLY EVERY DAY
8. IF YOU CHECKED OFF ANY PROBLEMS, HOW DIFFICULT HAVE THESE MADE IT FOR YOU TO DO YOUR WORK, TAKE CARE OF THINGS AT HOME, OR GET ALONG WITH OTHER PEOPLE?: SOMEWHAT DIFFICULT
2. NOT BEING ABLE TO STOP OR CONTROL WORRYING: SEVERAL DAYS
3. WORRYING TOO MUCH ABOUT DIFFERENT THINGS: NEARLY EVERY DAY
5. BEING SO RESTLESS THAT IT IS HARD TO SIT STILL: SEVERAL DAYS
IF YOU CHECKED OFF ANY PROBLEMS ON THIS QUESTIONNAIRE, HOW DIFFICULT HAVE THESE PROBLEMS MADE IT FOR YOU TO DO YOUR WORK, TAKE CARE OF THINGS AT HOME, OR GET ALONG WITH OTHER PEOPLE: SOMEWHAT DIFFICULT
GAD7 TOTAL SCORE: 11
1. FEELING NERVOUS, ANXIOUS, OR ON EDGE: SEVERAL DAYS
GAD7 TOTAL SCORE: 11

## 2023-05-02 ASSESSMENT — PATIENT HEALTH QUESTIONNAIRE - PHQ9
SUM OF ALL RESPONSES TO PHQ QUESTIONS 1-9: 14
10. IF YOU CHECKED OFF ANY PROBLEMS, HOW DIFFICULT HAVE THESE PROBLEMS MADE IT FOR YOU TO DO YOUR WORK, TAKE CARE OF THINGS AT HOME, OR GET ALONG WITH OTHER PEOPLE: SOMEWHAT DIFFICULT
SUM OF ALL RESPONSES TO PHQ QUESTIONS 1-9: 14

## 2023-05-02 ASSESSMENT — ENCOUNTER SYMPTOMS: NERVOUS/ANXIOUS: 1

## 2023-05-02 NOTE — PROGRESS NOTES
Laura is a 23 year old who is being evaluated via a billable video visit.      How would you like to obtain your AVS? MyChart  If the video visit is dropped, the invitation should be resent by: Text to cell phone: [phone]  Will anyone else be joining your video visit? No          Assessment & Plan     HUMBERTO (generalized anxiety disorder)      Current mild episode of major depressive disorder without prior episode (H)      Patient will continue with the current Zoloft and dosing and followup in about 2-3 weeks.  She does contract for safety today and denies having thoughts of wanting to hurt herself or others.           Depression Screening Follow Up        5/2/2023     4:42 PM   PHQ   PHQ-9 Total Score 14   Q9: Thoughts of better off dead/self-harm past 2 weeks Not at all       Soraya Mireles PA-C  [facility]    Benjy Sanchez is a 23 year old, presenting for the following health issues:  Anxiety and Depression        5/2/2023     4:52 PM   Additional Questions   Roomed by JULIET Lucio   Accompanied by Self         5/2/2023     4:52 PM   Patient Reported Additional Medications   Patient reports taking the following new medications none     Anxiety    History of Present Illness       Mental Health Follow-up:  Patient presents to follow-up on Depression & Anxiety.Patient's depression since last visit has been:  Better  The patient is not having other symptoms associated with depression.  Patient's anxiety since last visit has been:  Better  The patient is not having other symptoms associated with anxiety.  Any significant life events: job concerns and housing concerns  Patient is feeling anxious or having panic attacks.  Patient has no concerns about alcohol or drug use.    She eats 0-1 servings of fruits and vegetables daily.She consumes 2 sweetened beverage(s) daily.She exercises with enough effort to increase her heart rate 9 or less minutes per day.  She exercises with enough effort to  increase her heart rate 3 or less days per week. She is missing 2 dose(s) of medications per week.  She is not taking prescribed medications regularly due to side effects and remembering to take.    Today's PHQ-9         PHQ-9 Total Score: 14    PHQ-9 Q9 Thoughts of better off dead/self-harm past 2 weeks :   Not at all    How difficult have these problems made it for you to do your work, take care of things at home, or get along with other people: Somewhat difficult  Today's HUMBERTO-7 Score: 11         5/2/2023     4:44 PM   HUMBERTO-7 SCORE   Total Score 11 (moderate anxiety)   Total Score 11         4/4/2023     3:02 PM 5/2/2023     4:42 PM   PHQ   PHQ-9 Total Score 14 14   Q9: Thoughts of better off dead/self-harm past 2 weeks Not at all Not at all      She has since moved and she is feeling like she is in a better place now.  She is still taking the Zoloft at 50 mg.  Patient does not live alone, she lives with her brother.  She reports that she has missed a dose or two of the zoloft each week and is working on getting better in taking it.  She says that she started by taking it in the am, but felt tired after taking it, so she then started taking it in the evening hours, but then she says that after about one hour or so, she isn't tired anymore and she has a hard time falling asleep.  She feels like the medication is helping her symptoms and making her feel better.             Review of Systems   Psychiatric/Behavioral: The patient is nervous/anxious.       Constitutional, HEENT, cardiovascular, pulmonary, gi and gu systems are negative, except as otherwise noted.      Objective           Vitals:  No vitals were obtained today due to virtual visit.    Physical Exam   GENERAL: Healthy, alert and no distress  EYES: Eyes grossly normal to inspection.  No discharge or erythema, or obvious scleral/conjunctival abnormalities.  RESP: No audible wheeze, cough, or visible cyanosis.  No visible retractions or increased work of  breathing.    SKIN: Visible skin clear. No significant rash, abnormal pigmentation or lesions.  NEURO: Cranial nerves grossly intact.  Mentation and speech appropriate for age.  PSYCH: Mentation appears normal, affect normal/bright, judgement and insight intact, normal speech and appearance well-groomed.      Soraya Mireles PA-C        Video-Visit Details    Type of service:  Video Visit   Video Start Time: 5:36 pm  Video End Time:5:55pm    Originating Location (pt. Location): Home    Distant Location (provider location):  On-site  Platform used for Video Visit: Irma

## 2023-05-03 ENCOUNTER — OFFICE VISIT (OUTPATIENT)
Dept: URGENT CARE | Facility: URGENT CARE | Age: 23
End: 2023-05-03
Payer: COMMERCIAL

## 2023-05-03 VITALS
OXYGEN SATURATION: 98 % | DIASTOLIC BLOOD PRESSURE: 79 MMHG | SYSTOLIC BLOOD PRESSURE: 121 MMHG | WEIGHT: 203 LBS | TEMPERATURE: 98.5 F | BODY MASS INDEX: 32.77 KG/M2 | HEART RATE: 95 BPM

## 2023-05-03 DIAGNOSIS — B96.89 BV (BACTERIAL VAGINOSIS): ICD-10-CM

## 2023-05-03 DIAGNOSIS — R35.0 URINARY FREQUENCY: Primary | ICD-10-CM

## 2023-05-03 DIAGNOSIS — J02.9 SORE THROAT: ICD-10-CM

## 2023-05-03 DIAGNOSIS — N76.0 BV (BACTERIAL VAGINOSIS): ICD-10-CM

## 2023-05-03 LAB
ALBUMIN UR-MCNC: NEGATIVE MG/DL
APPEARANCE UR: CLEAR
BACTERIA #/AREA URNS HPF: ABNORMAL /HPF
BILIRUB UR QL STRIP: NEGATIVE
CLUE CELLS: PRESENT
COLOR UR AUTO: YELLOW
DEPRECATED S PYO AG THROAT QL EIA: NEGATIVE
GLUCOSE UR STRIP-MCNC: NEGATIVE MG/DL
HGB UR QL STRIP: ABNORMAL
KETONES UR STRIP-MCNC: ABNORMAL MG/DL
LEUKOCYTE ESTERASE UR QL STRIP: NEGATIVE
NITRATE UR QL: NEGATIVE
PH UR STRIP: 5.5 [PH] (ref 5–7)
RBC #/AREA URNS AUTO: ABNORMAL /HPF
SP GR UR STRIP: >=1.03 (ref 1–1.03)
SQUAMOUS #/AREA URNS AUTO: ABNORMAL /LPF
TRICHOMONAS, WET PREP: ABNORMAL
UROBILINOGEN UR STRIP-ACNC: 0.2 E.U./DL
WBC #/AREA URNS AUTO: ABNORMAL /HPF
WBC'S/HIGH POWER FIELD, WET PREP: ABNORMAL
YEAST, WET PREP: ABNORMAL

## 2023-05-03 PROCEDURE — 99203 OFFICE O/P NEW LOW 30 MIN: CPT | Performed by: PHYSICIAN ASSISTANT

## 2023-05-03 PROCEDURE — 87210 SMEAR WET MOUNT SALINE/INK: CPT

## 2023-05-03 PROCEDURE — 87491 CHLMYD TRACH DNA AMP PROBE: CPT | Performed by: PHYSICIAN ASSISTANT

## 2023-05-03 PROCEDURE — 87591 N.GONORRHOEAE DNA AMP PROB: CPT | Performed by: PHYSICIAN ASSISTANT

## 2023-05-03 PROCEDURE — 81001 URINALYSIS AUTO W/SCOPE: CPT

## 2023-05-03 PROCEDURE — 87651 STREP A DNA AMP PROBE: CPT | Performed by: PHYSICIAN ASSISTANT

## 2023-05-03 RX ORDER — METRONIDAZOLE 7.5 MG/G
1 GEL VAGINAL AT BEDTIME
Qty: 70 G | Refills: 0 | Status: SHIPPED | OUTPATIENT
Start: 2023-05-03 | End: 2023-05-08

## 2023-05-03 ASSESSMENT — PAIN SCALES - GENERAL: PAINLEVEL: EXTREME PAIN (8)

## 2023-05-03 NOTE — PROGRESS NOTES
Assessment & Plan     1. Urinary frequency  UA negative  - UA Macroscopic with reflex to Microscopic and Culture  - Wet preparation  - Chlamydia trachomatis PCR  - Neisseria gonorrhoeae PCR  - UA Microscopic with Reflex to Culture    2. Sore throat  No evidence of PTA, RPA or deep neck space abscess  Tylenol/ IBU for pain  Salt water gargles  - Streptococcus A Rapid Screen w/Reflex to PCR - Clinic Collect  - Group A Streptococcus PCR Throat Swab    3. BV (bacterial vaginosis)  Treatment as below  - metroNIDAZOLE (METROGEL) 0.75 % vaginal gel; Place 1 applicator (5 g) vaginally At Bedtime for 5 days  Dispense: 70 g; Refill: 0        Return in about 1 week (around 5/10/2023), or if symptoms worsen or fail to improve.    Diagnosis and treatment plan was reviewed with patient and/or family.   We went over any labs or imaging. Discussed worsening symptoms or little to no relief despite treatment plan to follow-up with PCP or return to clinic.  Patient verbalizes understanding. All questions were addressed and answered.     Alexandra Jensen PA-C  [facility]    CHIEF COMPLAINT:   Chief Complaint   Patient presents with     Urgent Care     Pt states that she has had throat pain since April 4th, nauseous, feels like something is in back of throat, pt requests to have std throat swab.     Subjective     Laura is a 23 year old female who presents to clinic today for evaluation.  Intermittent throat pain for the past 1 month, feels often globus sensation.  Today feels sore.    Complaining of vaginal discharge and foul odor for the past several weeks.  Would like STD testing.  No dysuria or hematuria.      Past Medical History:   Diagnosis Date     Scoliosis      Past Surgical History:   Procedure Laterality Date     CHOLECYSTOSTOMY  2017     SPINE SURGERY  2013    hx scoliosis     Social History     Tobacco Use     Smoking status: Never     Passive exposure: Never     Smokeless tobacco: Never   Vaping  Overdue lab reminder sent to patient via [employer].  Order extended.    Use     Vaping status: Never Used     Passive vaping exposure: Yes   Substance Use Topics     Alcohol use: Not on file     Current Outpatient Medications   Medication     medroxyPROGESTERone (DEPO-PROVERA) 150 MG/ML IM injection     metroNIDAZOLE (METROGEL) 0.75 % vaginal gel     sertraline (ZOLOFT) 50 MG tablet     No current facility-administered medications for this visit.     Allergies   Allergen Reactions     Diazepam      Seasonal Allergies        10 point ROS of systems were all negative except for pertinent positives noted in my HPI.      Exam:   /79 (BP Location: Right arm, Patient Position: Sitting, Cuff Size: Adult Regular)   Pulse 95   Temp 98.5  F (36.9  C) (Oral)   Wt 92.1 kg (203 lb)   SpO2 98%   BMI 32.77 kg/m    Constitutional: healthy, alert and no distress  Head: Normocephalic, atraumatic.  Eyes: conjunctiva clear, no drainage  ENT: TMs clear and shiny peyton, nasal mucosa pink and moist, throat erythematous without trismus or drooling.   Neck: neck is supple, no cervical lymphadenopathy or nuchal rigidity  Cardiovascular: RRR  Respiratory: CTA bilaterally, no rhonchi or rales  Gastrointestinal: soft and nontender. No rebound, guarding or rigidity  BACK: No CVAT B/L  Skin: no rashes  Neurologic: Speech clear, gait normal. Moves all extremities.    Results for orders placed or performed in visit on 05/03/23   UA Macroscopic with reflex to Microscopic and Culture     Status: Abnormal    Specimen: Urine, Clean Catch   Result Value Ref Range    Color Urine Yellow Colorless, Straw, Light Yellow, Yellow    Appearance Urine Clear Clear    Glucose Urine Negative Negative mg/dL    Bilirubin Urine Negative Negative    Ketones Urine Trace (A) Negative mg/dL    Specific Gravity Urine >=1.030 1.003 - 1.035    Blood Urine Small (A) Negative    pH Urine 5.5 5.0 - 7.0    Protein Albumin Urine Negative Negative mg/dL    Urobilinogen Urine 0.2 0.2, 1.0 E.U./dL    Nitrite Urine Negative Negative    Leukocyte  Esterase Urine Negative Negative   UA Microscopic with Reflex to Culture     Status: Abnormal   Result Value Ref Range    Bacteria Urine Few (A) None Seen /HPF    RBC Urine 0-2 0-2 /HPF /HPF    WBC Urine 0-5 0-5 /HPF /HPF    Squamous Epithelials Urine Few (A) None Seen /LPF    Narrative    Urine Culture not indicated   Wet preparation     Status: Abnormal    Specimen: Vagina; Swab   Result Value Ref Range    Trichomonas Absent Absent    Yeast Absent Absent    Clue Cells Present (A) Absent    WBCs/high power field 2+ (A) None   Streptococcus A Rapid Screen w/Reflex to PCR - Clinic Collect     Status: Normal    Specimen: Throat; Swab   Result Value Ref Range    Group A Strep antigen Negative Negative

## 2023-05-04 LAB — GROUP A STREP BY PCR: NOT DETECTED

## 2023-05-05 ENCOUNTER — TELEPHONE (OUTPATIENT)
Dept: URGENT CARE | Facility: URGENT CARE | Age: 23
End: 2023-05-05
Payer: COMMERCIAL

## 2023-05-05 DIAGNOSIS — A74.9 CHLAMYDIA INFECTION: Primary | ICD-10-CM

## 2023-05-05 LAB
C TRACH DNA SPEC QL NAA+PROBE: POSITIVE
N GONORRHOEA DNA SPEC QL NAA+PROBE: NEGATIVE

## 2023-05-05 RX ORDER — DOXYCYCLINE HYCLATE 100 MG
100 TABLET ORAL 2 TIMES DAILY
Qty: 14 TABLET | Refills: 0 | Status: SHIPPED | OUTPATIENT
Start: 2023-05-05 | End: 2023-05-12

## 2023-05-12 ENCOUNTER — OFFICE VISIT (OUTPATIENT)
Dept: URGENT CARE | Facility: URGENT CARE | Age: 23
End: 2023-05-12
Payer: COMMERCIAL

## 2023-05-12 VITALS
SYSTOLIC BLOOD PRESSURE: 116 MMHG | DIASTOLIC BLOOD PRESSURE: 79 MMHG | RESPIRATION RATE: 16 BRPM | OXYGEN SATURATION: 97 % | TEMPERATURE: 98.2 F | HEART RATE: 68 BPM

## 2023-05-12 DIAGNOSIS — A74.9 CHLAMYDIA INFECTION: ICD-10-CM

## 2023-05-12 DIAGNOSIS — R05.1 ACUTE COUGH: Primary | ICD-10-CM

## 2023-05-12 PROCEDURE — 87635 SARS-COV-2 COVID-19 AMP PRB: CPT | Performed by: PHYSICIAN ASSISTANT

## 2023-05-12 PROCEDURE — 99214 OFFICE O/P EST MOD 30 MIN: CPT | Performed by: PHYSICIAN ASSISTANT

## 2023-05-12 RX ORDER — AZITHROMYCIN 500 MG/1
1000 TABLET, FILM COATED ORAL ONCE
Qty: 2 TABLET | Refills: 0 | Status: SHIPPED | OUTPATIENT
Start: 2023-05-12 | End: 2023-05-12

## 2023-05-12 NOTE — LETTER
May 12, 2023      Laura Barrera  [address] [address] [place] [address]        To Whom It May Concern:    Laura Barrera was seen in our clinic. Please excuse from work, 5/12/2023.       Sincerely,        Alexandra Jensen PA-C

## 2023-05-12 NOTE — PROGRESS NOTES
Assessment & Plan     1. Cough  Suspect viral.  Okay to take Mucinex for cough and continue with zyrtec  Fluids and rest   - Symptomatic COVID-19 Virus (Coronavirus) by PCR Nose    2. Chlamydia infection  She is not tolerating doxycycline, will switch to azithromycin.  Retest in approximately 3 months, and CDC recommendation.  Safer sex  - azithromycin (ZITHROMAX) 500 MG tablet; Take 2 tablets (1,000 mg) by mouth once for 1 dose  Dispense: 2 tablet; Refill: 0  - NEISSERIA GONORRHOEA PCR; Future  - CHLAMYDIA TRACHOMATIS PCR; Future    Diagnosis and treatment plan was reviewed with patient and/or family.   We went over any labs or imaging. Discussed worsening symptoms or little to no relief despite treatment plan to follow-up with PCP or return to clinic.  Patient verbalizes understanding. All questions were addressed and answered.     Alexandra Jensen PA-C  [facility]    CHIEF COMPLAINT:   Chief Complaint   Patient presents with     Medication Problem     Seen in  5/3 for std test-dx with chlamydia-unable to take both medication-causes nausea and felt uncomfortable, missed doses-requesting alternative for both medication     Cough     2.5 weeks, chest congestion     Subjective     Laura is a 23 year old female who presents to clinic today for evaluation.  Patient was diagnosed with chlamydia on 5/5/2023.  She was prescribed doxycycline, but has been unable to take this medication due to nausea.  She has been several doses. She informed her partner. Vaginal symptoms have improved.       Past Medical History:   Diagnosis Date     Scoliosis      Past Surgical History:   Procedure Laterality Date     CHOLECYSTOSTOMY  2017     SPINE SURGERY  2013    hx scoliosis     Social History     Tobacco Use     Smoking status: Never     Passive exposure: Never     Smokeless tobacco: Never   Vaping Use     Vaping status: Never Used     Passive vaping exposure: Yes   Substance Use Topics     Alcohol use:  Not on file     Current Outpatient Medications   Medication     azithromycin (ZITHROMAX) 500 MG tablet     doxycycline hyclate (VIBRA-TABS) 100 MG tablet     medroxyPROGESTERone (DEPO-PROVERA) 150 MG/ML IM injection     sertraline (ZOLOFT) 50 MG tablet     No current facility-administered medications for this visit.     Allergies   Allergen Reactions     Diazepam      Seasonal Allergies        10 point ROS of systems were all negative except for pertinent positives noted in my HPI.      Exam:   /79   Pulse 68   Temp 98.2  F (36.8  C)   Resp 16   SpO2 97%   Constitutional: healthy, alert and no distress  Head: Normocephalic, atraumatic.  Eyes: conjunctiva clear, no drainage  ENT: TMs clear and shiny peyton, nasal mucosa pink and moist, throat without tonsillar hypertrophy or erythema  Neck: neck is supple, no cervical lymphadenopathy or nuchal rigidity  Cardiovascular: RRR  Respiratory: CTA bilaterally, no rhonchi or rales  Skin: no rashes  Neurologic: Speech clear, gait normal. Moves all extremities.    No results found for any visits on 05/12/23.

## 2023-05-12 NOTE — PATIENT INSTRUCTIONS
Take the prescription of azithromycin for chlamydia infection    I have placed future orders for you to come back in about 3 months for a retest. You will see the results in [facility]. If these results are positive, we will contact you.

## 2023-05-13 LAB — SARS-COV-2 RNA RESP QL NAA+PROBE: NEGATIVE

## 2023-09-22 ENCOUNTER — HOSPITAL ENCOUNTER (EMERGENCY)
Facility: CLINIC | Age: 23
Discharge: HOME | End: 2023-09-23
Attending: EMERGENCY MEDICINE | Admitting: EMERGENCY MEDICINE
Payer: COMMERCIAL

## 2023-09-22 DIAGNOSIS — G44.89 OTHER HEADACHE SYNDROME: ICD-10-CM

## 2023-09-22 LAB
ALBUMIN SERPL BCG-MCNC: 4.5 G/DL (ref 3.5–5.2)
ALBUMIN UR-MCNC: NEGATIVE MG/DL
ALP SERPL-CCNC: 121 U/L (ref 35–104)
ALT SERPL W P-5'-P-CCNC: 92 U/L (ref 0–50)
ANION GAP SERPL CALCULATED.3IONS-SCNC: 8 MMOL/L (ref 7–15)
APPEARANCE UR: CLEAR
AST SERPL W P-5'-P-CCNC: 44 U/L (ref 0–45)
BASOPHILS # BLD AUTO: 0 10E3/UL (ref 0–0.2)
BASOPHILS NFR BLD AUTO: 1 %
BILIRUB SERPL-MCNC: 0.6 MG/DL
BILIRUB UR QL STRIP: NEGATIVE
BUN SERPL-MCNC: 6.8 MG/DL (ref 6–20)
CALCIUM SERPL-MCNC: 9.6 MG/DL (ref 8.6–10)
CHLORIDE SERPL-SCNC: 106 MMOL/L (ref 98–107)
COLOR UR AUTO: YELLOW
CREAT SERPL-MCNC: 0.65 MG/DL (ref 0.51–0.95)
DEPRECATED HCO3 PLAS-SCNC: 25 MMOL/L (ref 22–29)
EGFRCR SERPLBLD CKD-EPI 2021: >90 ML/MIN/1.73M2
EOSINOPHIL # BLD AUTO: 0.1 10E3/UL (ref 0–0.7)
EOSINOPHIL NFR BLD AUTO: 1 %
ERYTHROCYTE [DISTWIDTH] IN BLOOD BY AUTOMATED COUNT: 12.5 % (ref 10–15)
FLUAV RNA SPEC QL NAA+PROBE: NEGATIVE
FLUBV RNA RESP QL NAA+PROBE: NEGATIVE
GLUCOSE SERPL-MCNC: 89 MG/DL (ref 70–99)
GLUCOSE UR STRIP-MCNC: NEGATIVE MG/DL
HCG UR QL: NEGATIVE
HCT VFR BLD AUTO: 40 % (ref 35–47)
HGB BLD-MCNC: 13.4 G/DL (ref 11.7–15.7)
HGB UR QL STRIP: ABNORMAL
IMM GRANULOCYTES # BLD: 0 10E3/UL
IMM GRANULOCYTES NFR BLD: 0 %
KETONES UR STRIP-MCNC: NEGATIVE MG/DL
LEUKOCYTE ESTERASE UR QL STRIP: NEGATIVE
LYMPHOCYTES # BLD AUTO: 2.9 10E3/UL (ref 0.8–5.3)
LYMPHOCYTES NFR BLD AUTO: 34 %
MCH RBC QN AUTO: 29.1 PG (ref 26.5–33)
MCHC RBC AUTO-ENTMCNC: 33.5 G/DL (ref 31.5–36.5)
MCV RBC AUTO: 87 FL (ref 78–100)
MONOCYTES # BLD AUTO: 0.7 10E3/UL (ref 0–1.3)
MONOCYTES NFR BLD AUTO: 8 %
MUCOUS THREADS #/AREA URNS LPF: PRESENT /LPF
NEUTROPHILS # BLD AUTO: 4.7 10E3/UL (ref 1.6–8.3)
NEUTROPHILS NFR BLD AUTO: 56 %
NITRATE UR QL: NEGATIVE
NRBC # BLD AUTO: 0 10E3/UL
NRBC BLD AUTO-RTO: 0 /100
PH UR STRIP: 5 [PH] (ref 5–7)
PLATELET # BLD AUTO: 288 10E3/UL (ref 150–450)
POTASSIUM SERPL-SCNC: 4.3 MMOL/L (ref 3.4–5.3)
PROT SERPL-MCNC: 7.1 G/DL (ref 6.4–8.3)
RBC # BLD AUTO: 4.61 10E6/UL (ref 3.8–5.2)
RBC URINE: <1 /HPF
RSV RNA SPEC NAA+PROBE: NEGATIVE
SARS-COV-2 RNA RESP QL NAA+PROBE: NEGATIVE
SODIUM SERPL-SCNC: 139 MMOL/L (ref 136–145)
SP GR UR STRIP: 1.03 (ref 1–1.03)
SQUAMOUS EPITHELIAL: 2 /HPF
UROBILINOGEN UR STRIP-MCNC: NORMAL MG/DL
WBC # BLD AUTO: 8.4 10E3/UL (ref 4–11)
WBC URINE: 2 /HPF

## 2023-09-22 PROCEDURE — 81025 URINE PREGNANCY TEST: CPT | Performed by: EMERGENCY MEDICINE

## 2023-09-22 PROCEDURE — 93005 ELECTROCARDIOGRAM TRACING: CPT

## 2023-09-22 PROCEDURE — 80053 COMPREHEN METABOLIC PANEL: CPT | Performed by: EMERGENCY MEDICINE

## 2023-09-22 PROCEDURE — 85025 COMPLETE CBC W/AUTO DIFF WBC: CPT | Performed by: EMERGENCY MEDICINE

## 2023-09-22 PROCEDURE — 99284 EMERGENCY DEPT VISIT MOD MDM: CPT

## 2023-09-22 PROCEDURE — 36415 COLL VENOUS BLD VENIPUNCTURE: CPT | Performed by: EMERGENCY MEDICINE

## 2023-09-22 PROCEDURE — 81001 URINALYSIS AUTO W/SCOPE: CPT | Performed by: EMERGENCY MEDICINE

## 2023-09-22 PROCEDURE — 87637 SARSCOV2&INF A&B&RSV AMP PRB: CPT | Performed by: EMERGENCY MEDICINE

## 2023-09-22 ASSESSMENT — ACTIVITIES OF DAILY LIVING (ADL): ADLS_ACUITY_SCORE: 35

## 2023-09-23 VITALS
HEIGHT: 66 IN | BODY MASS INDEX: 32.78 KG/M2 | TEMPERATURE: 98.5 F | DIASTOLIC BLOOD PRESSURE: 86 MMHG | OXYGEN SATURATION: 99 % | HEART RATE: 75 BPM | WEIGHT: 204 LBS | SYSTOLIC BLOOD PRESSURE: 125 MMHG | RESPIRATION RATE: 18 BRPM

## 2023-09-23 PROCEDURE — 250N000013 HC RX MED GY IP 250 OP 250 PS 637: Performed by: EMERGENCY MEDICINE

## 2023-09-23 PROCEDURE — 250N000012 HC RX MED GY IP 250 OP 636 PS 637: Performed by: EMERGENCY MEDICINE

## 2023-09-23 RX ORDER — PROCHLORPERAZINE MALEATE 10 MG
10 TABLET ORAL ONCE
Status: COMPLETED | OUTPATIENT
Start: 2023-09-23 | End: 2023-09-23

## 2023-09-23 RX ORDER — DIPHENHYDRAMINE HCL 25 MG
25 CAPSULE ORAL ONCE
Status: COMPLETED | OUTPATIENT
Start: 2023-09-23 | End: 2023-09-23

## 2023-09-23 RX ADMIN — PROCHLORPERAZINE MALEATE 10 MG: 10 TABLET ORAL at 00:25

## 2023-09-23 RX ADMIN — DEXAMETHASONE 10 MG: 2 TABLET ORAL at 00:25

## 2023-09-23 RX ADMIN — DIPHENHYDRAMINE HYDROCHLORIDE 25 MG: 25 CAPSULE ORAL at 00:25

## 2023-09-23 NOTE — ED NOTES
Writer attempts to collect blood work/place IV. Pt reports that she would rather not have IV right now. She is ok with getting one later if it is needed.    Will make the pt lab collect for now and place IV later if needed.

## 2023-09-23 NOTE — ED PROVIDER NOTES
"    History     Chief Complaint:  Headache       HPI   Laura Barrera is a 23 year old female with hx of HA normal scan 3/2022 with another headache.  She states migraines only seasonal and this is not her season.  No neck pain fever vision changes neuro sx.  States chest burning with this.  Body aches occasional.  No CP or SOB NVD VB VD dysuria.       Independent Historian:        Review of External Notes:  Head CT 3/2022    Medications:    medroxyPROGESTERone (DEPO-PROVERA) 150 MG/ML IM injection  sertraline (ZOLOFT) 50 MG tablet        Past Medical History:    Past Medical History:   Diagnosis Date    Scoliosis        Past Surgical History:    Past Surgical History:   Procedure Laterality Date    CHOLECYSTOSTOMY  2017    SPINE SURGERY  2013    hx scoliosis          Physical Exam   Patient Vitals for the past 24 hrs:   BP Temp Temp src Pulse Resp SpO2 Height Weight   09/22/23 2300 111/70 -- -- 67 -- 99 % -- --   09/22/23 2230 114/66 -- -- 68 -- 98 % -- --   09/22/23 2159 116/73 -- -- 66 16 98 % -- --   09/22/23 1934 (!) 138/100 98.5  F (36.9  C) Oral 73 16 99 % 1.676 m (5' 6\") 92.5 kg (204 lb)        Physical Exam  General: Patient is well appearing. No distress.  Head: Atraumatic.  Eyes: Conjunctivae and EOM are normal. No scleral icterus.  Neck: Normal range of motion. Neck supple.  No meningismus  Cardiovascular: Normal rate, regular rhythm, normal heart sounds and intact distal pulses.   Pulmonary/Chest: Breath sounds normal. No respiratory distress.  Abdominal: Soft. Bowel sounds are normal. No distension. No tenderness. No rebound or guarding.   Musculoskeletal: Normal range of motion.  Skin: Warm and dry. No rash noted. Not diaphoretic.      Emergency Department Course   ECG  NSR HR 59 early reol unchanged from previous        Laboratory:  Labs Ordered and Resulted from Time of ED Arrival to Time of ED Departure   ROUTINE UA WITH MICROSCOPIC REFLEX TO CULTURE - Abnormal       Result Value    Color Urine " Yellow      Appearance Urine Clear      Glucose Urine Negative      Bilirubin Urine Negative      Ketones Urine Negative      Specific Gravity Urine 1.026      Blood Urine Small (*)     pH Urine 5.0      Protein Albumin Urine Negative      Urobilinogen Urine Normal      Nitrite Urine Negative      Leukocyte Esterase Urine Negative      Mucus Urine Present (*)     RBC Urine <1      WBC Urine 2      Squamous Epithelials Urine 2 (*)    COMPREHENSIVE METABOLIC PANEL - Abnormal    Sodium 139      Potassium 4.3      Chloride 106      Carbon Dioxide (CO2) 25      Anion Gap 8      Urea Nitrogen 6.8      Creatinine 0.65      Calcium 9.6      Glucose 89      Alkaline Phosphatase 121 (*)     AST 44      ALT 92 (*)     Protein Total 7.1      Albumin 4.5      Bilirubin Total 0.6      GFR Estimate >90     HCG QUALITATIVE URINE - Normal    hCG Urine Qualitative Negative     INFLUENZA A/B, RSV, & SARS-COV2 PCR - Normal    Influenza A PCR Negative      Influenza B PCR Negative      RSV PCR Negative      SARS CoV2 PCR Negative     CBC WITH PLATELETS AND DIFFERENTIAL    WBC Count 8.4      RBC Count 4.61      Hemoglobin 13.4      Hematocrit 40.0      MCV 87      MCH 29.1      MCHC 33.5      RDW 12.5      Platelet Count 288      % Neutrophils 56      % Lymphocytes 34      % Monocytes 8      % Eosinophils 1      % Basophils 1      % Immature Granulocytes 0      NRBCs per 100 WBC 0      Absolute Neutrophils 4.7      Absolute Lymphocytes 2.9      Absolute Monocytes 0.7      Absolute Eosinophils 0.1      Absolute Basophils 0.0      Absolute Immature Granulocytes 0.0      Absolute NRBCs 0.0          Procedures       Emergency Department Course & Assessments:             Interventions:  Medications   prochlorperazine (COMPAZINE) tablet 10 mg (has no administration in time range)   diphenhydrAMINE (BENADRYL) capsule 25 mg (has no administration in time range)   dexAMETHasone (DECADRON) tablet 10 mg (has no administration in time range)         Assessments:      Independent Interpretation (X-rays, CTs, rhythm strip):      Consultations/Discussion of Management or Tests:         Social Determinants of Health affecting care:       Disposition:  The patient was discharged to home.     Impression & Plan        Medical Decision Making:  Laura Barrera is a 23 year old female who presents with a headache. She has  history of headaches.  I considered a broad differential diagnosis for this patient including tension, migraine, analgesic rebound, occipital neuralgia, etc.  I also considered other less common but serious causes considered included meningitis, encephalitis, subarachnoid bleed, temporal arteritis, stroke, tumor, etc.  She has no signs of serious headache etiologies at this point.  No advanced imaging is indicated, nor is CT/lumbar puncture for SAH.  Her questions were answered and she feels improved after above interventions in ED.  Supportive outpatient management is therefore indicated.  Headache precautions given for home.       Diagnosis:    ICD-10-CM    1. Other headache syndrome  G44.89            Discharge Medications:  New Prescriptions    No medications on file            9/23/2023   Juan M Cerna MD Stevens, Andrew C, MD  09/23/23 0028

## 2023-09-23 NOTE — ED TRIAGE NOTES
Pt has had a headache for the past 2 weeks. For the past 4 days has had nausea. Pt c/o burning in her chest.

## 2023-09-25 LAB
ATRIAL RATE - MUSE: 59 BPM
DIASTOLIC BLOOD PRESSURE - MUSE: NORMAL MMHG
INTERPRETATION ECG - MUSE: NORMAL
P AXIS - MUSE: 33 DEGREES
PR INTERVAL - MUSE: 148 MS
QRS DURATION - MUSE: 84 MS
QT - MUSE: 404 MS
QTC - MUSE: 399 MS
R AXIS - MUSE: 38 DEGREES
SYSTOLIC BLOOD PRESSURE - MUSE: NORMAL MMHG
T AXIS - MUSE: 29 DEGREES
VENTRICULAR RATE- MUSE: 59 BPM

## 2024-05-12 ENCOUNTER — HOSPITAL ENCOUNTER (EMERGENCY)
Facility: CLINIC | Age: 24
Discharge: HOME | End: 2024-05-12
Attending: EMERGENCY MEDICINE | Admitting: EMERGENCY MEDICINE

## 2024-05-12 VITALS
HEART RATE: 118 BPM | WEIGHT: 214.51 LBS | RESPIRATION RATE: 18 BRPM | DIASTOLIC BLOOD PRESSURE: 101 MMHG | SYSTOLIC BLOOD PRESSURE: 143 MMHG | BODY MASS INDEX: 34.47 KG/M2 | TEMPERATURE: 97.3 F | HEIGHT: 66 IN | OXYGEN SATURATION: 99 %

## 2024-05-12 DIAGNOSIS — G44.019 EPISODIC CLUSTER HEADACHE, NOT INTRACTABLE: ICD-10-CM

## 2024-05-12 PROCEDURE — 99282 EMERGENCY DEPT VISIT SF MDM: CPT

## 2024-05-12 ASSESSMENT — COLUMBIA-SUICIDE SEVERITY RATING SCALE - C-SSRS
1. IN THE PAST MONTH, HAVE YOU WISHED YOU WERE DEAD OR WISHED YOU COULD GO TO SLEEP AND NOT WAKE UP?: NO
6. HAVE YOU EVER DONE ANYTHING, STARTED TO DO ANYTHING, OR PREPARED TO DO ANYTHING TO END YOUR LIFE?: NO
2. HAVE YOU ACTUALLY HAD ANY THOUGHTS OF KILLING YOURSELF IN THE PAST MONTH?: NO

## 2024-05-12 ASSESSMENT — ACTIVITIES OF DAILY LIVING (ADL): ADLS_ACUITY_SCORE: 33

## 2024-05-12 NOTE — ED PROVIDER NOTES
"    History     Chief Complaint:  Headache     HPI   Laura Barrera is a 24 year old female with past medical history of migraine headaches who presents to the emergency department with episodic short lasting left-sided headaches.  Over the last couple months she has described sharp stabbing pain behind her left eye that radiates backwards that occurs several times a day.  These episodes will come in close proximity to 1 another.  Episodes will last anywhere from seconds to minutes.  No clear exacerbating or alleviating symptoms.  When they occur she does get some tunnel vision and some sensitivity to light.  Headaches are abated by Tylenol and ibuprofen.  She denies any trauma to the area.  She has migraine headaches but notes that these feel much different than the headaches that she has been experiencing recently.  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  She denies any slurred speech, neck pain or fever.  Denies any numbness or tingling in her arms or legs.  Denies any weakness.  She notes that she took some ibuprofen prior to coming to the emergency department and her headaches have since resolved.      Medications:    medroxyPROGESTERone (DEPO-PROVERA) 150 MG/ML IM injection  sertraline (ZOLOFT) 50 MG tablet      Past Medical History:    Past Medical History:   Diagnosis Date    Scoliosis        Past Surgical History:    Past Surgical History:   Procedure Laterality Date    CHOLECYSTOSTOMY  2017    SPINE SURGERY  2013    hx scoliosis          Physical Exam   Patient Vitals for the past 24 hrs:   BP Temp Temp src Pulse Resp SpO2 Height Weight   05/12/24 0023 (!) 143/101 97.3  F (36.3  C) Temporal 118 18 99 % 1.676 m (5' 6\") 97.3 kg (214 lb 8.1 oz)      Physical Exam  General: Patient is awake, alert and interactive.   Head: The scalp, face, and head appear normal. Atraumatic.   Eyes: The pupils are equal, round, and reactive to light. Conjunctivae " and sclerae are normal. EOMI without nystagmus.  Neck: Normal range of motion.   CV: Regular rate.   Resp: No respiratory distress.   GI: Abdomen is soft, no rigidity, guarding, or rebound. No distension. No tenderness to palpation in any quadrant.     MS: Normal tone.   Skin: No rash or lesions noted. Normal capillary refill noted  Neuro: Speech is normal and fluent. Face is symmetric without droop. Moving all extremities well. CN's II-XII intact. 5/5 UE and LE strength.   Sensation intact to light touch. Negative pronator drift. Finger to nose intact.   Normal gait.   Psych:  Normal affect.  Appropriate interactions.    Emergency Department Course       Emergency Department Course & Assessments:       Disposition:  The patient was discharged.    Impression & Plan      Medical Decision Making:  Laura Barrera is a 24 year old female who presents with a headache. She has history of headaches but this pattern of headache is different.  I considered a broad differential diagnosis for this patient including tension, migraine, analgesic rebound, occipital neuralgia, etc.  I also considered other less common but serious causes considered included meningitis, encephalitis, subarachnoid bleed, temporal arteritis, stroke, tumor, etc.  She has no signs of serious headache etiologies at this point.  We discussed obtaining imaging here but after using shared decision making we elected not to perform any advanced imaging.  Given her history of migraine headaches and now what appears to be cluster headaches will refer her to neurology.  Based on her history and physical exam today I believe this is most likely cluster headaches.  Her questions were answered and she feels improved after above interventions in ED.  Supportive outpatient management is therefore indicated.  Headache precautions given for home.       Diagnosis:    ICD-10-CM    1. Episodic cluster headache, not intractable  G44.019 Adult Neurology  Referral          MD Rachel Benavides, Gilberto Mensah MD  05/12/24 0129

## 2024-05-12 NOTE — ED TRIAGE NOTES
Pt has had cluster headache off/on for 2 months. Pt reports she is getting sharp pains behind her left eye. Pt reports hx of scoliosis and has metal in her spine and is having back pain

## 2024-06-11 ENCOUNTER — OFFICE VISIT (OUTPATIENT)
Dept: URGENT CARE | Facility: URGENT CARE | Age: 24
End: 2024-06-11
Payer: MEDICAID

## 2024-06-11 VITALS
DIASTOLIC BLOOD PRESSURE: 83 MMHG | RESPIRATION RATE: 18 BRPM | OXYGEN SATURATION: 98 % | SYSTOLIC BLOOD PRESSURE: 138 MMHG | TEMPERATURE: 98.3 F | HEART RATE: 95 BPM

## 2024-06-11 DIAGNOSIS — R10.13 EPIGASTRIC PAIN: ICD-10-CM

## 2024-06-11 DIAGNOSIS — R11.2 NAUSEA AND VOMITING, UNSPECIFIED VOMITING TYPE: ICD-10-CM

## 2024-06-11 DIAGNOSIS — R10.11 RIGHT UPPER QUADRANT PAIN: Primary | ICD-10-CM

## 2024-06-11 DIAGNOSIS — Z32.00 PREGNANCY EXAMINATION OR TEST, PREGNANCY UNCONFIRMED: ICD-10-CM

## 2024-06-11 LAB — HCG UR QL: NEGATIVE

## 2024-06-11 PROCEDURE — 99214 OFFICE O/P EST MOD 30 MIN: CPT | Performed by: PHYSICIAN ASSISTANT

## 2024-06-11 PROCEDURE — 81025 URINE PREGNANCY TEST: CPT | Performed by: PHYSICIAN ASSISTANT

## 2024-06-11 NOTE — PATIENT INSTRUCTIONS
June 11, 2024 Urgent Care Plan:     I advise you have an adult  take you from our urgent care to the emergency room now for evaluation of your right upper quadrant pain, epigastric pain (pain above your belly button), nausea, progressive vomiting (by  your report  you have not been able to eat any solid foods for the past 4 days (vomit with all attempts to eat solid food).     Do not eat or drink anything until you check with the emergency room doctor.

## 2024-06-11 NOTE — PROGRESS NOTES
ASSESSMENT/PLAN:    (R10.11) Right upper quadrant pain  (primary encounter diagnosis)    MDM: Three-week history of progressive nausea and vomiting and a 24-year-old female (who is status postcholecystectomy by her report) of uncertain etiology.  She now also endorses mid epigastric pain and right upper quadrant pain, along with inability to keep down any solid foods for the past 4 days.  Patient notes initial nausea and vomiting seem to be provoked by smell aversion and she wondered about possible pregnancy.  By her report, she has not missed any of her Depo injections.  Urine pregnancy test was negative here today.      Due to her report of inability to take in any solid foods, along with her progression of nausea/vomiting--and the development of midepigastric and right upper quadrant pain I have advised she report to the emergency room now for further evaluation.  Differential diagnosis at this time is very broad, and includes, but is not limited to common bile duct stone, pancreatitis, occult nephrolithiasis, peptic ulcer disease, and bowel obstruction.  All above was discussed with patient in layperson terms today.  She verbalizes understanding of and agrees to report to the emergency room now.    Please also see the below discharge summary/plan (which I reviewed with patient verbally today and provided in printed form for her to hand carry to the emergency room now to assist in continuity of care between our urgent care facility and the emergency room department).    Plan:     AVS-Plan:         June 11, 2024 Urgent Care Plan:     I advise you have an adult  take you from our urgent care to the emergency room now for evaluation of your right upper quadrant pain, epigastric pain (pain above your belly button), nausea, progressive vomiting (by  your report  you have not been able to eat any solid foods for the past 4 days (vomit with all attempts to eat solid food).     Do not eat or drink anything until  "you check with the emergency room doctor.     (R10.13) Epigastric pain    (R11.2) Nausea and vomiting, unspecified vomiting type      (Z32.00) Pregnancy examination or test, pregnancy unconfirmed  Plan: HCG qualitative urine            This progress note has been dictated, with use of voice recognition software. Any grammatical, typographical, or context errors are unintentional and inherent to use of voice recognition software.    Urgent Care Progress Note is complete and signed 2:05 pm     ------------------------------      Chief Complaint   Patient presents with    Urgent Care     Nausea x3 weeks, random intermittent headaches.  Wants to confirm not pregnant.             SUBJECTIVE:     Laura Barrera is a 24-year-old female who presents to urgent care today for evaluation of progressively worsening nausea and non-bloody vomiting--now also associated with right upper quadrant pain and midepigastric pain over the past week.  Patient states she has been unable to \"keep down\" any solid foods for the past 4 days, prompting her urgent care visit today.    HPI: Patient states her nausea and vomiting initially began approximately 3 weeks ago.  Patient states she noticed certain adverse smells (such as trash or rotten food smell) caused her to have immediate nausea and vomiting.  Over the past week, patient states she has had nausea and vomiting with every attempt to eat any solid food (including attempt to eat a peanut butter and jelly sandwich this morning at approximately 10:40 AM).  Over the past week she also notes associated midepigastric and right upper quadrant pain  seems to be brought on with any attempt to eat solid foods.      Associated Symptoms:     -RUQ pain and epigastric pain (intermittent previously, but now described as moderate pain and constant today).     -Brief, transient, self limiting headache behind left eye that comes on with nausea/vomiting and is resolved after vomiting.      -Last attempt to " eat solid food: Tried to eat a PBJ sandwich at ~10:40 am and vomited right after     -Liquid intake: Good PO liquid intake/states normal urination     -Last BM: Today (soft, non-bloody)    -SURGERY HX by patient report:  S/p-cholecystectomy age 17. Scoliosis surgery age 13.           Past Medical History:   Diagnosis Date    Scoliosis        Patient Active Problem List   Diagnosis    Migraine without aura and without status migrainosus, not intractable     Past Surgical History:   Procedure Laterality Date    CHOLECYSTOSTOMY  2017    SPINE SURGERY  2013    hx scoliosis       Current Outpatient Medications   Medication Sig Dispense Refill    medroxyPROGESTERone (DEPO-PROVERA) 150 MG/ML IM injection Inject 150 mg into the muscle       No current facility-administered medications for this visit.       SURGERY HX by patient report:  S/p-cholecystectomy age 17. Scoliosis surgery age 13.     Allergies   Allergen Reactions    Diazepam     Seasonal Allergies          ROS:   CONSTITUTIONAL: No acute onset fever,chills or severe weakenss  CARDIAC: No acute onset chest pain or unexplained shortness of breath. No known history of AAA  RESP: No acute onset cough, chest pain or shortness of breath   GI: See HPI above.No history of PUD. No black or bloody vomitus or black or bloody stools    GYN: G0. Sexually active (male partner). Depo injections for contraception since 2022. No regular menses due to Depo (only gets spotting when time for her next injection). Next Depo injection is reportedly due 6/29/24. No acute pelvic pain. No abnormal vaginal discharge. Urine pregnancy test negative here today.   SKIN: No rashes.  Normal color.  Sclera clear.    URINARY: No acute dysuria, urinary urgency/frequency, hematuria, fever, back or flank pain.  SKIN: No acute systemic rash or hives. No jaundice.   NEURO: Positive as per above. No acute neck stiffness or lethargy.   RHEUM: No associated acute onset red, warm or swollen joints      BP  138/83   Pulse 95   Temp 98.3  F (36.8  C) (Tympanic)   Resp 18   SpO2 98%         GENERAL:  Alert. NAD  HEENT:   Conjunctiva without infection.  Sclera clear/non-icteric  NECK: Trachea is midline. Neck is supple, FROM with no adenopathy  CARDIAC:NORMAL - regular rate and rhythm without murmur., normal s1/s2 and without extra heart sounds  RESP: No increased work of breathing at rest. Able to speak full sentences without pause. Normal - CTA without rales, rhonchi, or wheezing.  ABDOMEN:  Positive for tenderness in mid-epigastric area and RUQ. Abdomen is soft. No guarding or rebound tenderness. No HSM or masses.  No suprapubic tenderness.  No CVA tenderness.  LE: No LE edema     LAB:     Results for orders placed or performed in visit on 06/11/24   HCG qualitative urine     Status: Normal   Result Value Ref Range    hCG Urine Qualitative Negative Negative

## 2024-06-26 ENCOUNTER — OFFICE VISIT (OUTPATIENT)
Dept: URGENT CARE | Facility: URGENT CARE | Age: 24
End: 2024-06-26
Payer: MEDICAID

## 2024-06-26 VITALS
SYSTOLIC BLOOD PRESSURE: 111 MMHG | HEART RATE: 90 BPM | OXYGEN SATURATION: 96 % | WEIGHT: 212.25 LBS | BODY MASS INDEX: 34.26 KG/M2 | RESPIRATION RATE: 16 BRPM | DIASTOLIC BLOOD PRESSURE: 76 MMHG | TEMPERATURE: 98.5 F

## 2024-06-26 DIAGNOSIS — H00.025 HORDEOLUM INTERNUM OF LEFT LOWER EYELID: Primary | ICD-10-CM

## 2024-06-26 DIAGNOSIS — R11.0 NAUSEA: ICD-10-CM

## 2024-06-26 LAB — HCG UR QL: NEGATIVE

## 2024-06-26 PROCEDURE — 99213 OFFICE O/P EST LOW 20 MIN: CPT | Performed by: PHYSICIAN ASSISTANT

## 2024-06-26 PROCEDURE — 81025 URINE PREGNANCY TEST: CPT

## 2024-06-26 RX ORDER — ERYTHROMYCIN 5 MG/G
0.5 OINTMENT OPHTHALMIC AT BEDTIME
Qty: 3.5 G | Refills: 0 | Status: SHIPPED | OUTPATIENT
Start: 2024-06-26

## 2024-06-26 NOTE — PATIENT INSTRUCTIONS
Start using romycin at night  Warm compresses to the eye  Call to make an appt for comprehensive eye exam.  [phone]

## 2024-06-26 NOTE — PROGRESS NOTES
Assessment & Plan     1. Hordeolum internum of left lower eyelid  Frequent warm soaks, use antibiotic ophthalmic ointment as prescribed, and follow up if symptoms persist or worsen. It may take several days for this to resolve. Rarely, these persist or enlarge and in that event, she will need to see an Ophthalmologist. Patient agrees with the medical treatment plan.  Blurry vision was transient, and not currently present.  Vision is normal and Neurologically she is intact. She was given the number to call her optometrist/ophthalmologist for further evaluation and comprehensive eye exam.  - erythromycin (ROMYCIN) 5 MG/GM ophthalmic ointment; Place 0.5 inches Into the left eye at bedtime  Dispense: 3.5 g; Refill: 0    2. Nausea  Mild and ongoing\  HCG negative  Advised to discuss with PCP  - HCG Qual, Urine ([id number]); Future  - HCG Qual, Urine ([id number])      Return in about 3 days (around 6/29/2024) for Ophthamology .    Diagnosis and treatment plan was reviewed with patient and/or family.   We went over any labs or imaging. Discussed worsening symptoms or little to no relief despite treatment plan to follow-up with PCP or return to clinic.  Patient verbalizes understanding. All questions were addressed and answered.     Alexandra Jensen PA-C  [facility]    CHIEF COMPLAINT:   Chief Complaint   Patient presents with    Eye Problem     Yesterday, left eye-lower lid has bump, painful, swollen-headache, dizziness R 10/10 L 10/10    Nausea     Follow up visit 6/11 for breast tenderness/nausea-wanted to do pregnancy test, had 2 neg at home-had depo shot yesterday     Benjy Sanchez is a 24 year old female who presents to clinic today for evaluation of eye problem.     She had a headache last night. Headache did not seem like a migraine headache. No difficulty speaking, walking. No numbness or tingling. She took excedrin for her migraine.   The left eye seemed to be blurry.  She has had  "episodes like this in the past frequently, but this usually resolves with rubbing her eyes.  This lasted about 2 hours.  When she woke up in the morning, vision was back to normal.    She has had a painful \"bump\" under her left eye for one day.      Past Medical History:   Diagnosis Date    Scoliosis      Past Surgical History:   Procedure Laterality Date    CHOLECYSTOSTOMY  2017    SPINE SURGERY  2013    hx scoliosis     Social History     Tobacco Use    Smoking status: Never     Passive exposure: Never    Smokeless tobacco: Never   Substance Use Topics    Alcohol use: Not on file     Current Outpatient Medications   Medication Sig Dispense Refill    erythromycin (ROMYCIN) 5 MG/GM ophthalmic ointment Place 0.5 inches Into the left eye at bedtime 3.5 g 0    medroxyPROGESTERone (DEPO-PROVERA) 150 MG/ML IM injection Inject 150 mg into the muscle       No current facility-administered medications for this visit.     Allergies   Allergen Reactions    Diazepam     Seasonal Allergies        10 point ROS of systems were all negative except for pertinent positives noted in my HPI.      Exam:   /76   Pulse 90   Temp 98.5  F (36.9  C)   Resp 16   Wt 96.3 kg (212 lb 4 oz)   SpO2 96%   BMI 34.26 kg/m    Constitutional: healthy, alert and no distress  Eyes: Slight swelling with erythema noted on the left lower lid. conjunctiva injected on the left and hordeolum noted, no drainage. EOMI. PERRL.   ENT: MMM. throat without tonsillar hypertrophy or erythema  Neck: neck is supple, no cervical lymphadenopathy or nuchal rigidity  Cardiovascular: RRR  Respiratory: CTA bilaterally, no rhonchi or rales  kin: no rashes  Neurologic: Speech clear, gait normal. Moves all extremities.    Results for orders placed or performed in visit on 06/26/24   HCG Qual, Urine ([id number])     Status: Normal   Result Value Ref Range    hCG Urine Qualitative Negative Negative           "

## 2024-07-07 ENCOUNTER — HEALTH MAINTENANCE LETTER (OUTPATIENT)
Age: 24
End: 2024-07-07

## 2024-08-08 ENCOUNTER — APPOINTMENT (OUTPATIENT)
Dept: GENERAL RADIOLOGY | Facility: CLINIC | Age: 24
End: 2024-08-08
Attending: EMERGENCY MEDICINE
Payer: MEDICAID

## 2024-08-08 ENCOUNTER — HOSPITAL ENCOUNTER (EMERGENCY)
Facility: CLINIC | Age: 24
Discharge: HOME | End: 2024-08-08
Attending: EMERGENCY MEDICINE | Admitting: EMERGENCY MEDICINE
Payer: MEDICAID

## 2024-08-08 VITALS
WEIGHT: 212.74 LBS | BODY MASS INDEX: 34.34 KG/M2 | SYSTOLIC BLOOD PRESSURE: 146 MMHG | DIASTOLIC BLOOD PRESSURE: 92 MMHG | TEMPERATURE: 97.8 F | HEART RATE: 100 BPM | RESPIRATION RATE: 18 BRPM | OXYGEN SATURATION: 97 %

## 2024-08-08 DIAGNOSIS — J06.9 VIRAL URI WITH COUGH: ICD-10-CM

## 2024-08-08 LAB
FLUAV RNA SPEC QL NAA+PROBE: NEGATIVE
FLUBV RNA RESP QL NAA+PROBE: NEGATIVE
GROUP A STREP BY PCR: NOT DETECTED
RSV RNA SPEC NAA+PROBE: NEGATIVE
SARS-COV-2 RNA RESP QL NAA+PROBE: NEGATIVE

## 2024-08-08 PROCEDURE — 87651 STREP A DNA AMP PROBE: CPT | Performed by: EMERGENCY MEDICINE

## 2024-08-08 PROCEDURE — 87637 SARSCOV2&INF A&B&RSV AMP PRB: CPT | Performed by: EMERGENCY MEDICINE

## 2024-08-08 PROCEDURE — 99284 EMERGENCY DEPT VISIT MOD MDM: CPT | Mod: 25

## 2024-08-08 PROCEDURE — 71046 X-RAY EXAM CHEST 2 VIEWS: CPT

## 2024-08-08 RX ORDER — DEXAMETHASONE 4 MG/1
12 TABLET ORAL ONCE
Qty: 3 TABLET | Refills: 0 | Status: SHIPPED | OUTPATIENT
Start: 2024-08-08 | End: 2024-08-08

## 2024-08-08 RX ORDER — BENZONATATE 200 MG/1
200 CAPSULE ORAL 3 TIMES DAILY PRN
Qty: 21 CAPSULE | Refills: 0 | Status: SHIPPED | OUTPATIENT
Start: 2024-08-08 | End: 2024-08-15

## 2024-08-08 RX ORDER — ALBUTEROL SULFATE 90 UG/1
1-2 AEROSOL, METERED RESPIRATORY (INHALATION) EVERY 4 HOURS PRN
Qty: 8.5 G | Refills: 1 | Status: SHIPPED | OUTPATIENT
Start: 2024-08-08

## 2024-08-08 RX ORDER — GUAIFENESIN/DEXTROMETHORPHAN 100-10MG/5
5 SYRUP ORAL 4 TIMES DAILY PRN
Qty: 118 ML | Refills: 0 | Status: SHIPPED | OUTPATIENT
Start: 2024-08-08

## 2024-08-08 NOTE — ED TRIAGE NOTES
Pt states that she has bee coughing since 0300 and that her throat was very sore and dry when she woke up she states  she was also very sweaty.  Pt denies SOB n/v/d     Triage Assessment (Adult)       Row Name 08/08/24 0810          Triage Assessment    Airway WDL WDL        Respiratory WDL    Respiratory WDL WDL        Skin Circulation/Temperature WDL    Skin Circulation/Temperature WDL WDL        Cardiac WDL    Cardiac WDL WDL        Peripheral/Neurovascular WDL    Peripheral Neurovascular WDL WDL        Cognitive/Neuro/Behavioral WDL    Cognitive/Neuro/Behavioral WDL WDL        [place] Coma Scale    Best Eye Response 4-->(E4) spontaneous     Best Motor Response 6-->(M6) obeys commands     Best Verbal Response 5-->(V5) oriented     [place] Coma Scale Score 15

## 2024-08-08 NOTE — ED PROVIDER NOTES
Emergency Department Note      History of Present Illness     Chief Complaint   Pharyngitis    HPI   Laura Barrera is a 24 year old female presenting with pharyngitis after being exposed to Covid last week. Her worst symptom upon examination is a headache that started three days ago (8/5/24). She initially attributed her headache to her typical cluster headache. Around 0300, she started cough and notes soreness and dryness. No history of inhaler or nebulizer use. Laura notes waking up this morning with hot and cold sweats. She denies ear pain, vomiting, black stool, bloody stool, or changes in urination.     Independent Historian   None    Review of External Notes   None    Past Medical History     Medical History and Problem List   Scoliosis   Migraine   Cluster headache     Medications   Hydroxyzine     Surgical History   Cholecystectomy   Spine surgery     Physical Exam     Patient Vitals for the past 24 hrs:   BP Temp Temp src Pulse Resp SpO2 Weight   08/08/24 0823 (!) 146/92 97.8  F (36.6  C) Temporal 100 18 97 % 96.5 kg (212 lb 11.9 oz)     Physical Exam    HEENT:         Normal conjunctiva, No  discharge           R TM normal, external ear and EAC normal         L  TM normal, external ear and EAC normal           Posterior oropharynx:               Yes erythema,               No exudates,               Tonsillar hypertrophy - no              uvula midline - Yes    Neck: no adenopathy      Lungs:     clear to auscultation    Heart: Tachycardic, no m/r/g, ppi    Neuro: alert, no focal gross focal deficits    Psych: Normal mood and affect          Diagnostics     Lab Results   Labs Ordered and Resulted from Time of ED Arrival to Time of ED Departure   INFLUENZA A/B, RSV, & SARS-COV2 PCR - Normal       Result Value    Influenza A PCR Negative      Influenza B PCR Negative      RSV PCR Negative      SARS CoV2 PCR Negative     GROUP A STREPTOCOCCUS PCR THROAT SWAB - Normal    Group A strep by PCR Not Detected        Imaging   XR Chest 2 Views    (Results Pending)     Independent Interpretation   I independently interpreted the chest XR, no acute pulmonary concerns or concerning cardiomegaly    ED Course      Medications Administered   Medications - No data to display    Procedures   Procedures   None    Discussion of Management   None    ED Course   ED Course as of 08/08/24 0957   Thu Aug 08, 2024   0928 I obtained the history and examined the patient as noted above.      0946 Prev spine hardware noted, no acute pulmonary concerns or concerning cardiomegaly to my read    0950 I rechecked and updated the patient.        Additional Documentation  None    Medical Decision Making / Diagnosis     CMS Diagnoses: None    MIPS       None    MDM   Laura Barrera is a 24 year old female presenting with viral respiratory tract infectious symptoms.  COVID flu RSV swab negative, strep swab negative, no exudative tonsillitis on examination, no concerns for significant respiratory distress.  Radiograph unremarkable.  Discharged home with supportive care.    Disposition   The patient was discharged.     Diagnosis     ICD-10-CM    1. Viral URI with cough  J06.9          Discharge Medications   Discharge Medication List as of 8/8/2024  9:53 AM        START taking these medications    Details   albuterol (PROAIR HFA/PROVENTIL HFA/VENTOLIN HFA) 108 (90 Base) MCG/ACT inhaler Inhale 1-2 puffs into the lungs every 4 hours as needed for shortness of breath or cough, Disp-8.5 g, R-1, E-PrescribeWITH SPACER      benzonatate (TESSALON) 200 MG capsule Take 1 capsule (200 mg) by mouth 3 times daily as needed for cough, Disp-21 capsule, R-0, E-Prescribe      dexAMETHasone (DECADRON) 4 MG tablet Take 3 tablets (12 mg) by mouth once for 1 dose, Disp-3 tablet, R-0, E-Prescribe      guaiFENesin-dextromethorphan (ROBITUSSIN DM) 100-10 MG/5ML syrup Take 5 mLs by mouth 4 times daily as needed for cough, Disp-118 mL, R-0, E-Prescribe           Scribe  Disclosure:  I, Pascale Luann, am serving as a scribe at 9:32 AM on 8/8/2024 to document services personally performed by Reed Bragg MD based on my observations and the provider's statements to me.        Reed Bragg MD  08/08/24 0989

## 2024-08-08 NOTE — Clinical Note
Laura Barrera was seen and treated in our emergency department on 8/8/2024.  She may return to work on 08/12/2024.       If you have any questions or concerns, please don't hesitate to call.      Reed Bragg MD

## 2024-11-22 ENCOUNTER — APPOINTMENT (OUTPATIENT)
Dept: CT IMAGING | Facility: CLINIC | Age: 24
End: 2024-11-22
Attending: EMERGENCY MEDICINE
Payer: COMMERCIAL

## 2024-11-22 ENCOUNTER — HOSPITAL ENCOUNTER (EMERGENCY)
Facility: CLINIC | Age: 24
Discharge: HOME | End: 2024-11-22
Attending: EMERGENCY MEDICINE | Admitting: EMERGENCY MEDICINE
Payer: COMMERCIAL

## 2024-11-22 VITALS
HEART RATE: 76 BPM | TEMPERATURE: 98.2 F | SYSTOLIC BLOOD PRESSURE: 144 MMHG | BODY MASS INDEX: 36 KG/M2 | HEIGHT: 66 IN | RESPIRATION RATE: 16 BRPM | OXYGEN SATURATION: 98 % | DIASTOLIC BLOOD PRESSURE: 72 MMHG | WEIGHT: 223.99 LBS

## 2024-11-22 DIAGNOSIS — R19.7 VOMITING AND DIARRHEA: ICD-10-CM

## 2024-11-22 DIAGNOSIS — R11.0 NAUSEA: ICD-10-CM

## 2024-11-22 DIAGNOSIS — R10.32 LLQ ABDOMINAL PAIN: ICD-10-CM

## 2024-11-22 DIAGNOSIS — R11.10 VOMITING AND DIARRHEA: ICD-10-CM

## 2024-11-22 DIAGNOSIS — R10.13 EPIGASTRIC PAIN: ICD-10-CM

## 2024-11-22 DIAGNOSIS — R51.9 ACUTE NONINTRACTABLE HEADACHE, UNSPECIFIED HEADACHE TYPE: ICD-10-CM

## 2024-11-22 LAB
ALBUMIN SERPL BCG-MCNC: 4.5 G/DL (ref 3.5–5.2)
ALBUMIN UR-MCNC: NEGATIVE MG/DL
ALP SERPL-CCNC: 142 U/L (ref 40–150)
ALT SERPL W P-5'-P-CCNC: 131 U/L (ref 0–50)
ANION GAP SERPL CALCULATED.3IONS-SCNC: 12 MMOL/L (ref 7–15)
APPEARANCE UR: CLEAR
AST SERPL W P-5'-P-CCNC: 57 U/L (ref 0–45)
ATRIAL RATE - MUSE: 66 BPM
BASOPHILS # BLD AUTO: 0 10E3/UL (ref 0–0.2)
BASOPHILS NFR BLD AUTO: 0 %
BILIRUB SERPL-MCNC: 0.4 MG/DL
BILIRUB UR QL STRIP: NEGATIVE
BUN SERPL-MCNC: 7.1 MG/DL (ref 6–20)
CALCIUM SERPL-MCNC: 9.2 MG/DL (ref 8.8–10.4)
CHLORIDE SERPL-SCNC: 106 MMOL/L (ref 98–107)
CLUE CELLS: ABNORMAL
COLOR UR AUTO: ABNORMAL
CREAT SERPL-MCNC: 0.59 MG/DL (ref 0.51–0.95)
DIASTOLIC BLOOD PRESSURE - MUSE: NORMAL MMHG
EGFRCR SERPLBLD CKD-EPI 2021: >90 ML/MIN/1.73M2
EOSINOPHIL # BLD AUTO: 0.1 10E3/UL (ref 0–0.7)
EOSINOPHIL NFR BLD AUTO: 1 %
ERYTHROCYTE [DISTWIDTH] IN BLOOD BY AUTOMATED COUNT: 12.2 % (ref 10–15)
GLUCOSE SERPL-MCNC: 90 MG/DL (ref 70–99)
GLUCOSE UR STRIP-MCNC: NEGATIVE MG/DL
HCG SERPL QL: NEGATIVE
HCO3 SERPL-SCNC: 19 MMOL/L (ref 22–29)
HCT VFR BLD AUTO: 40.5 % (ref 35–47)
HGB BLD-MCNC: 13.6 G/DL (ref 11.7–15.7)
HGB UR QL STRIP: ABNORMAL
IMM GRANULOCYTES # BLD: 0.1 10E3/UL
IMM GRANULOCYTES NFR BLD: 1 %
INTERPRETATION ECG - MUSE: NORMAL
KETONES UR STRIP-MCNC: NEGATIVE MG/DL
LEUKOCYTE ESTERASE UR QL STRIP: NEGATIVE
LIPASE SERPL-CCNC: 21 U/L (ref 13–60)
LYMPHOCYTES # BLD AUTO: 2.4 10E3/UL (ref 0.8–5.3)
LYMPHOCYTES NFR BLD AUTO: 27 %
MAGNESIUM SERPL-MCNC: 2.3 MG/DL (ref 1.7–2.3)
MCH RBC QN AUTO: 28.5 PG (ref 26.5–33)
MCHC RBC AUTO-ENTMCNC: 33.6 G/DL (ref 31.5–36.5)
MCV RBC AUTO: 85 FL (ref 78–100)
MONOCYTES # BLD AUTO: 0.7 10E3/UL (ref 0–1.3)
MONOCYTES NFR BLD AUTO: 7 %
MUCOUS THREADS #/AREA URNS LPF: PRESENT /LPF
NEUTROPHILS # BLD AUTO: 5.9 10E3/UL (ref 1.6–8.3)
NEUTROPHILS NFR BLD AUTO: 64 %
NITRATE UR QL: NEGATIVE
NRBC # BLD AUTO: 0 10E3/UL
NRBC BLD AUTO-RTO: 0 /100
P AXIS - MUSE: 29 DEGREES
PH UR STRIP: 6.5 [PH] (ref 5–7)
PLATELET # BLD AUTO: 296 10E3/UL (ref 150–450)
POTASSIUM SERPL-SCNC: 4.5 MMOL/L (ref 3.4–5.3)
PR INTERVAL - MUSE: 148 MS
PROT SERPL-MCNC: 7.4 G/DL (ref 6.4–8.3)
QRS DURATION - MUSE: 78 MS
QT - MUSE: 388 MS
QTC - MUSE: 406 MS
R AXIS - MUSE: 35 DEGREES
RBC # BLD AUTO: 4.78 10E6/UL (ref 3.8–5.2)
RBC URINE: 2 /HPF
SODIUM SERPL-SCNC: 137 MMOL/L (ref 135–145)
SP GR UR STRIP: 1.02 (ref 1–1.03)
SQUAMOUS EPITHELIAL: 2 /HPF
SYSTOLIC BLOOD PRESSURE - MUSE: NORMAL MMHG
T AXIS - MUSE: 36 DEGREES
TRICHOMONAS, WET PREP: ABNORMAL
UROBILINOGEN UR STRIP-MCNC: NORMAL MG/DL
VENTRICULAR RATE- MUSE: 66 BPM
WBC # BLD AUTO: 9.2 10E3/UL (ref 4–11)
WBC URINE: 1 /HPF
WBC'S/HIGH POWER FIELD, WET PREP: ABNORMAL
YEAST, WET PREP: ABNORMAL

## 2024-11-22 PROCEDURE — 99285 EMERGENCY DEPT VISIT HI MDM: CPT | Mod: 25

## 2024-11-22 PROCEDURE — 85048 AUTOMATED LEUKOCYTE COUNT: CPT | Performed by: EMERGENCY MEDICINE

## 2024-11-22 PROCEDURE — 87591 N.GONORRHOEAE DNA AMP PROB: CPT | Performed by: EMERGENCY MEDICINE

## 2024-11-22 PROCEDURE — 82247 BILIRUBIN TOTAL: CPT | Performed by: EMERGENCY MEDICINE

## 2024-11-22 PROCEDURE — 83735 ASSAY OF MAGNESIUM: CPT | Performed by: EMERGENCY MEDICINE

## 2024-11-22 PROCEDURE — 87491 CHLMYD TRACH DNA AMP PROBE: CPT | Performed by: EMERGENCY MEDICINE

## 2024-11-22 PROCEDURE — 84703 CHORIONIC GONADOTROPIN ASSAY: CPT | Performed by: EMERGENCY MEDICINE

## 2024-11-22 PROCEDURE — 96375 TX/PRO/DX INJ NEW DRUG ADDON: CPT

## 2024-11-22 PROCEDURE — 93005 ELECTROCARDIOGRAM TRACING: CPT

## 2024-11-22 PROCEDURE — 250N000011 HC RX IP 250 OP 636: Performed by: EMERGENCY MEDICINE

## 2024-11-22 PROCEDURE — 96374 THER/PROPH/DIAG INJ IV PUSH: CPT | Mod: 59

## 2024-11-22 PROCEDURE — 85018 HEMOGLOBIN: CPT | Performed by: EMERGENCY MEDICINE

## 2024-11-22 PROCEDURE — 83690 ASSAY OF LIPASE: CPT | Performed by: EMERGENCY MEDICINE

## 2024-11-22 PROCEDURE — 85004 AUTOMATED DIFF WBC COUNT: CPT | Performed by: EMERGENCY MEDICINE

## 2024-11-22 PROCEDURE — 81001 URINALYSIS AUTO W/SCOPE: CPT | Performed by: EMERGENCY MEDICINE

## 2024-11-22 PROCEDURE — 87210 SMEAR WET MOUNT SALINE/INK: CPT | Performed by: EMERGENCY MEDICINE

## 2024-11-22 PROCEDURE — 36415 COLL VENOUS BLD VENIPUNCTURE: CPT | Performed by: EMERGENCY MEDICINE

## 2024-11-22 PROCEDURE — 74177 CT ABD & PELVIS W/CONTRAST: CPT

## 2024-11-22 PROCEDURE — 80048 BASIC METABOLIC PNL TOTAL CA: CPT | Performed by: EMERGENCY MEDICINE

## 2024-11-22 RX ORDER — IOPAMIDOL 755 MG/ML
100 INJECTION, SOLUTION INTRAVASCULAR ONCE
Status: COMPLETED | OUTPATIENT
Start: 2024-11-22 | End: 2024-11-22

## 2024-11-22 RX ORDER — KETOROLAC TROMETHAMINE 15 MG/ML
15 INJECTION, SOLUTION INTRAMUSCULAR; INTRAVENOUS ONCE
Status: COMPLETED | OUTPATIENT
Start: 2024-11-22 | End: 2024-11-22

## 2024-11-22 RX ORDER — ONDANSETRON 2 MG/ML
4 INJECTION INTRAMUSCULAR; INTRAVENOUS ONCE
Status: COMPLETED | OUTPATIENT
Start: 2024-11-22 | End: 2024-11-22

## 2024-11-22 RX ORDER — DEXAMETHASONE SODIUM PHOSPHATE 10 MG/ML
10 INJECTION, SOLUTION INTRAMUSCULAR; INTRAVENOUS ONCE
Status: COMPLETED | OUTPATIENT
Start: 2024-11-22 | End: 2024-11-22

## 2024-11-22 RX ORDER — METOCLOPRAMIDE HYDROCHLORIDE 5 MG/ML
10 INJECTION INTRAMUSCULAR; INTRAVENOUS ONCE
Status: COMPLETED | OUTPATIENT
Start: 2024-11-22 | End: 2024-11-22

## 2024-11-22 RX ORDER — ONDANSETRON 4 MG/1
4 TABLET, ORALLY DISINTEGRATING ORAL EVERY 8 HOURS PRN
Qty: 10 TABLET | Refills: 0 | Status: SHIPPED | OUTPATIENT
Start: 2024-11-22 | End: 2024-11-25

## 2024-11-22 RX ORDER — DIPHENHYDRAMINE HYDROCHLORIDE 50 MG/ML
25 INJECTION INTRAMUSCULAR; INTRAVENOUS ONCE
Status: COMPLETED | OUTPATIENT
Start: 2024-11-22 | End: 2024-11-22

## 2024-11-22 RX ADMIN — DIPHENHYDRAMINE HYDROCHLORIDE 25 MG: 50 INJECTION, SOLUTION INTRAMUSCULAR; INTRAVENOUS at 13:45

## 2024-11-22 RX ADMIN — METOCLOPRAMIDE 10 MG: 5 INJECTION, SOLUTION INTRAMUSCULAR; INTRAVENOUS at 13:45

## 2024-11-22 RX ADMIN — KETOROLAC TROMETHAMINE 15 MG: 15 INJECTION, SOLUTION INTRAMUSCULAR; INTRAVENOUS at 11:04

## 2024-11-22 RX ADMIN — ONDANSETRON 4 MG: 2 INJECTION INTRAMUSCULAR; INTRAVENOUS at 11:04

## 2024-11-22 RX ADMIN — DEXAMETHASONE SODIUM PHOSPHATE 10 MG: 10 INJECTION, SOLUTION INTRAMUSCULAR; INTRAVENOUS at 13:45

## 2024-11-22 RX ADMIN — IOPAMIDOL 100 ML: 755 INJECTION, SOLUTION INTRAVENOUS at 12:25

## 2024-11-22 ASSESSMENT — ACTIVITIES OF DAILY LIVING (ADL)
ADLS_ACUITY_SCORE: 0

## 2024-11-22 ASSESSMENT — COLUMBIA-SUICIDE SEVERITY RATING SCALE - C-SSRS
2. HAVE YOU ACTUALLY HAD ANY THOUGHTS OF KILLING YOURSELF IN THE PAST MONTH?: NO
1. IN THE PAST MONTH, HAVE YOU WISHED YOU WERE DEAD OR WISHED YOU COULD GO TO SLEEP AND NOT WAKE UP?: NO
6. HAVE YOU EVER DONE ANYTHING, STARTED TO DO ANYTHING, OR PREPARED TO DO ANYTHING TO END YOUR LIFE?: NO

## 2024-11-22 NOTE — ED TRIAGE NOTES
Pt complains of vomiting & diarrhea x2 days. No blood in stool or vomit. Pt complains of epigastric abd pain. Pt states the last time she felt sick like this she was dx w/ STD. Pt states she had unprotected sex two wks ago.

## 2024-11-22 NOTE — ED PROVIDER NOTES
"Emergency Department Note      Code Status: No Order    History of Present Illness     Chief Complaint:  Nausea, Vomiting, & Diarrhea       HPI   Laura Barrera is a 24 year old female with a history of cholecystectomy who presents with nausea, vomiting, and diarrhea. Patient notes the onset of nausea, vomiting, and diarrhea as three days ago. She states that she has been only vomiting 30 minutes to an hour after ingesting food. She states that her bowel movements have been mostly liquid. She endorses centralized epigastric pain with no radiation to her back. She endorses some dysuria but notes that she has a history of sexually transmitted infections. She states that she has not been able to detect any abnormal vaginal discharge since she is on her menstrual cycle. Of note, patient states that she has been light-headed, dizzy, and has sustained a headache for upwards of four days ago, predating her nausea, vomiting. She endorses a history of scoliosis surgery with implanted rods.     Independent Historian:    None    Review of External Notes  None    Past Medical History   Medical History, Surgical History, Problem List, and Medications  Reviewed in Epic    Physical Exam   Patient Vitals for the past 24 hrs:   BP Temp Temp src Pulse Resp SpO2 Height Weight   11/22/24 1406 (!) 144/72 -- -- 76 16 98 % -- --   11/22/24 0943 (!) 149/86 98.2  F (36.8  C) Oral 104 17 98 % 1.676 m (5' 6\") 101.6 kg (223 lb 15.8 oz)       Physical Exam  Constitutional: Vital signs reviewed as above.   Eyes: PEERL, EOMI B/L  Neck: No JVD noted. FROM   Cardiovascular: tachycardic rate, Regular rhythm and normal heart sounds.  No murmur heard. Equal B/L peripheral pulses.  Pulmonary/Chest: Effort normal and breath sounds normal. No respiratory distress. Patient has no wheezes. Patient has no rales.   Gastrointestinal: Soft. There is epigastric, LLQ, and suprapubic tenderness. There is suprapubic tenderness to " percussion.  Musculoskeletal/Extremities: No pitting edema noted. Normal tone.  Skin: Skin is warm and dry. There is no diaphoresis noted.   Psychiatric: The patient appears calm.     Diagnostics     Laboratory: Imaging:   Labs Ordered and Resulted from Time of ED Arrival to Time of ED Departure   COMPREHENSIVE METABOLIC PANEL - Abnormal       Result Value    Sodium 137      Potassium 4.5      Carbon Dioxide (CO2) 19 (*)     Anion Gap 12      Urea Nitrogen 7.1      Creatinine 0.59      GFR Estimate >90      Calcium 9.2      Chloride 106      Glucose 90      Alkaline Phosphatase 142      AST 57 (*)      (*)     Protein Total 7.4      Albumin 4.5      Bilirubin Total 0.4     ROUTINE UA WITH MICROSCOPIC REFLEX TO CULTURE - Abnormal    Color Urine Light Yellow      Appearance Urine Clear      Glucose Urine Negative      Bilirubin Urine Negative      Ketones Urine Negative      Specific Gravity Urine 1.023      Blood Urine Moderate (*)     pH Urine 6.5      Protein Albumin Urine Negative      Urobilinogen Urine Normal      Nitrite Urine Negative      Leukocyte Esterase Urine Negative      Mucus Urine Present (*)     RBC Urine 2      WBC Urine 1      Squamous Epithelials Urine 2 (*)    WET PREPARATION - Abnormal    Trichomonas Absent      Yeast Absent      Clue Cells Absent      WBCs/high power field 1+ (*)    MAGNESIUM - Normal    Magnesium 2.3     LIPASE - Normal    Lipase 21     HCG QUALITATIVE PREGNANCY - Normal    hCG Serum Qualitative Negative     CBC WITH PLATELETS AND DIFFERENTIAL    WBC Count 9.2      RBC Count 4.78      Hemoglobin 13.6      Hematocrit 40.5      MCV 85      MCH 28.5      MCHC 33.6      RDW 12.2      Platelet Count 296      % Neutrophils 64      % Lymphocytes 27      % Monocytes 7      % Eosinophils 1      % Basophils 0      % Immature Granulocytes 1      NRBCs per 100 WBC 0      Absolute Neutrophils 5.9      Absolute Lymphocytes 2.4      Absolute Monocytes 0.7      Absolute Eosinophils 0.1       Absolute Basophils 0.0      Absolute Immature Granulocytes 0.1      Absolute NRBCs 0.0     CHLAMYDIA TRACHOMATIS PCR   NEISSERIA GONORRHOEAE PCR     CT Abdomen Pelvis w Contrast   Final Result   IMPRESSION:    1.  No acute abnormality seen in the abdomen and pelvis.      EUFEMIA VILLA MD            SYSTEM ID:  [id number]            EKG   ECG results from 11/22/24   EKG 12-lead, tracing only     Value    Systolic Blood Pressure     Diastolic Blood Pressure     Ventricular Rate 66    Atrial Rate 66    AR Interval 148    QRS Duration 78        QTc 406    P Axis 29    R AXIS 35    T Axis 36    Interpretation ECG      Sinus rhythm  Normal ECG  When compared with ECG of 22-Sep-2023 22:12,  No significant change was found  Interpreted by me at 1054       Independent Interpretation  See ED course    ED Course    Medications Administered  Medications   ondansetron (ZOFRAN) injection 4 mg (4 mg Intravenous $Given 11/22/24 1104)   ketorolac (TORADOL) injection 15 mg (15 mg Intravenous $Given 11/22/24 1104)   iopamidol (ISOVUE-370) solution 100 mL (100 mLs Intravenous $Given 11/22/24 1225)   sodium chloride (PF) 0.9% PF flush 65 mL (65 mLs Intravenous $Given 11/22/24 1225)   metoclopramide (REGLAN) injection 10 mg (10 mg Intravenous $Given 11/22/24 1345)   diphenhydrAMINE (BENADRYL) injection 25 mg (25 mg Intravenous $Given 11/22/24 1345)   dexAMETHasone PF (DECADRON) injection 10 mg (10 mg Intravenous $Given 11/22/24 1345)       Procedures  Procedures     Discussion of Management  See ED Course    ED Course  ED Course as of 11/22/24 1506   Fri Nov 22, 2024   1013 I obtained history and examined the patient as noted above   1329 Rechecked and updated. Still with some HA   1402 Patient feels ready for discharge.       Optional/Additional Documentation: None    Medical Decision Making / Diagnosis     MIPS     None    Medical Decision Making:  This 24-year-old presents due to nausea, vomiting, diarrhea, and abdominal  pain.  Please see the HPI and exam for specifics.  A broad differential was considered including inflammatory bowel issues, bowel obstruction, intra-abdominal infection, viral process, electrolyte dysfunction, etc.  The above workup was undertaken.    Labs are notable for reassuring urinalysis, and overall reassuring labs aside from elevation in AST and ALT.  The patient does note a history of similar symptoms when she had an STI in the past.  Chlamydia and gonorrhea testing are pending.  Wet prep testing is negative.    CT of the abdomen and pelvis does not reveal any acute intra-abdominal process.  The patient has no gallbladder so right upper quadrant ultrasound was not pursued.    The patient also noted some degree of headache from the vomiting and nausea.  Symptoms seem to have improved after medications.  She felt comfortable going home.  I think that outpatient follow-up is reasonable though she should return with new or worsening symptoms.  Primary care referral order placed as well.    Critical Care:  None.    Disposition:  See ED Course and MDM    ICD-10 Codes:    ICD-10-CM    1. Vomiting and diarrhea  R11.10 ED To Primary Care Referral    R19.7       2. Nausea  R11.0 ED To Primary Care Referral      3. Epigastric pain  R10.13 ED To Primary Care Referral      4. LLQ abdominal pain  R10.32 ED To Primary Care Referral      5. Acute nonintractable headache, unspecified headache type  R51.9            Discharge Medications:  Discharge Medication List as of 11/22/2024  2:03 PM        START taking these medications    Details   ondansetron (ZOFRAN ODT) 4 MG ODT tab Take 1 tablet (4 mg) by mouth every 8 hours as needed for nausea or vomiting., Disp-10 tablet, R-0, E-Prescribe              11/22/2024   Deejay Taveras,      [employer]        Deejay Taveras,   11/22/24 3410

## 2024-11-22 NOTE — DISCHARGE INSTRUCTIONS
What do you do next:   Continue your home medications unless we have specifically changed them  If medications were prescribed today, take these as directed.  You can use over-the-counter acetaminophen (Tylenol ) and ibuprofen for fever or pain control as applicable to your visit today.  Acetaminophen (Tylenol): Take 500 to 1000 mg by mouth every 6 hours as needed for fever or pain.  Do not take more than 4000 total milligrams of acetaminophen-containing products in a 24-hour timeframe.  Ibuprofen: Take 600 milligrams by mouth every 6-8 hours as needed for fever or pain.  Take this with food or milk to avoid stomach upset.  You will be contacted if your gonorrhea or chlamydia testing is positive. Treatment can be prescribed if needed.  Follow up as indicated below    When do you return: Review your discharge papers for specifics on reasons to return.    Thank you for allowing us to care for you today.

## 2024-11-23 LAB
C TRACH DNA SPEC QL NAA+PROBE: NEGATIVE
N GONORRHOEA DNA SPEC QL NAA+PROBE: NEGATIVE

## 2025-02-11 ENCOUNTER — APPOINTMENT (OUTPATIENT)
Dept: GENERAL RADIOLOGY | Facility: CLINIC | Age: 25
End: 2025-02-11
Attending: EMERGENCY MEDICINE
Payer: COMMERCIAL

## 2025-02-11 ENCOUNTER — HOSPITAL ENCOUNTER (EMERGENCY)
Facility: CLINIC | Age: 25
Discharge: HOME | End: 2025-02-11
Attending: PHYSICIAN ASSISTANT | Admitting: PHYSICIAN ASSISTANT
Payer: COMMERCIAL

## 2025-02-11 VITALS
WEIGHT: 227.74 LBS | DIASTOLIC BLOOD PRESSURE: 78 MMHG | HEART RATE: 80 BPM | RESPIRATION RATE: 18 BRPM | TEMPERATURE: 97 F | BODY MASS INDEX: 36.76 KG/M2 | SYSTOLIC BLOOD PRESSURE: 125 MMHG | OXYGEN SATURATION: 100 %

## 2025-02-11 DIAGNOSIS — S46.912A STRAIN OF LEFT SHOULDER, INITIAL ENCOUNTER: ICD-10-CM

## 2025-02-11 PROCEDURE — 99283 EMERGENCY DEPT VISIT LOW MDM: CPT

## 2025-02-11 PROCEDURE — 73030 X-RAY EXAM OF SHOULDER: CPT | Mod: LT

## 2025-02-11 RX ORDER — CYCLOBENZAPRINE HCL 10 MG
10 TABLET ORAL 2 TIMES DAILY PRN
Qty: 20 TABLET | Refills: 0 | Status: SHIPPED | OUTPATIENT
Start: 2025-02-11

## 2025-02-11 ASSESSMENT — COLUMBIA-SUICIDE SEVERITY RATING SCALE - C-SSRS
6. HAVE YOU EVER DONE ANYTHING, STARTED TO DO ANYTHING, OR PREPARED TO DO ANYTHING TO END YOUR LIFE?: NO
1. IN THE PAST MONTH, HAVE YOU WISHED YOU WERE DEAD OR WISHED YOU COULD GO TO SLEEP AND NOT WAKE UP?: NO
2. HAVE YOU ACTUALLY HAD ANY THOUGHTS OF KILLING YOURSELF IN THE PAST MONTH?: NO

## 2025-02-11 ASSESSMENT — ACTIVITIES OF DAILY LIVING (ADL): ADLS_ACUITY_SCORE: 41

## 2025-02-11 NOTE — ED TRIAGE NOTES
Presents to triage with c/o L shoulder pain. Patient states that she was sleeping with her arm in a weird position last night and that when she went to move it she heard a pop and began having a lot of pain. She was hoping pain would improve but it hasn't. She has limited ROM and states when she moves her arm in certain ways she hears a pop. Took medication she thinks was ibuprofen at about 1530        
yes

## 2025-02-11 NOTE — ED PROVIDER NOTES
Emergency Department Note      History of Present Illness     Chief Complaint   Shoulder Injury      HPI   Laura Barrera is a 24 year old female who presents with left shoulder pain.  The patient thinks she slept on her left shoulder funny last night.  She woke up and it was painful to move and she would hear a pop.  It hurts mostly when she lifts it up high.  Denies direct injury or trauma.  No fever.  No chest pain diaphoresis vomiting or shortness of breath.  No prior surgeries.  Took some ibuprofen this afternoon.  No numbness in the extremities.    Independent Historian   None    Review of External Notes   Reviewed Ortho Dr. Bloom note from 12/12/24 pt seen for lunbar radiculopathy and disc disease.  Has hx of scoliosis.    Past Medical History     Medical History and Problem List   Past Medical History:   Diagnosis Date    Scoliosis        Medications   albuterol (PROAIR HFA/PROVENTIL HFA/VENTOLIN HFA) 108 (90 Base) MCG/ACT inhaler  cyclobenzaprine (FLEXERIL) 10 MG tablet  dexAMETHasone (DECADRON) 4 MG tablet  erythromycin (ROMYCIN) 5 MG/GM ophthalmic ointment  guaiFENesin-dextromethorphan (ROBITUSSIN DM) 100-10 MG/5ML syrup  medroxyPROGESTERone (DEPO-PROVERA) 150 MG/ML IM injection        Surgical History   Past Surgical History:   Procedure Laterality Date    CHOLECYSTOSTOMY  2017    SPINE SURGERY  2013    hx scoliosis       Physical Exam     Patient Vitals for the past 24 hrs:   BP Temp Temp src Pulse Resp SpO2 Weight   02/11/25 1703 (!) 151/104 97  F (36.1  C) Temporal 110 18 97 % 103.3 kg (227 lb 11.8 oz)     Physical Exam  General: Well appearing.  Non-toxic    Head:  Atraumatic, external ears and nose normal.    Neck:  Normal range of motion without rigidity.    CV:  Regular rate and rhythm    No pathologic murmur, rubs, or gallops.    Resp:  Breath sounds are clear bilaterally.  No crackles, wheezes, rhonchi.    Non-labored, no retractions or accessory muscle use.     MS:  Mild deltoid ttp.  Ranging  well but pain w/raising above head/abduction    No redness or effusion seen.  Able to perform passive external rotation of joint without significant pain.  No peripheral edema of upper extremities.  Compartments soft.  There is no tenderness or deformity of sternoclavicular joint, clavicle, AC joint.    Normal hook test of distal biceps tendon. No palpable deformity of tendon.    PROM of elbow without pain.      Neuro:  Normal strength and sensation at elbows, and radial, median, ulnar nerve distributions of hands Bl.    Skin:  2+ brachial and radial pulses.  Normal cap refill.  No rashes, fluctuance, or crepitance.    Psych: Alert, oriented.  Appropriate interactions.          Diagnostics         Imaging   XR Shoulder Left G/E 3 Views   Final Result   IMPRESSION: Anatomic alignment left shoulder. No acute displaced left shoulder fracture. Normal left acromioclavicular and glenohumeral joint spaces. Partial visualization of postop change thoracic spine.          Independent Interpretation   X-ray L shoulder shows no clavical or humeral fracture. AC joint normal. No dislocation    ED Course      Medications Administered   Medications - No data to display    Procedures   Procedures     Discussion of Management   None    ED Course        Additional Documentation  None    Medical Decision Making / Diagnosis     CMS Diagnoses: None    MIPS       None    MDM   24 year old female presents with L shoulder pain.  Patient history and records reviewed.  Broad differential was considered.  Patient is well appearing with stable vitals.  X-rays were obtained which demonstrates no evidence of fracture or dislocation.  Examination of joint above and below does not suggest referred pain and do not feel radiographs of these joints are indicated at this time.  Neurovascular status is intact distally and no signs of compartment syndrome.  I suspect muscular strain is most likely. There is no evidence to suggest dvt/ thoracic outlet  syndrome as an etiology for symptoms.The patient's pain is clearly mechanical in nature, reproducible with movement/palpation, and there is no associated chest pain or shortness of breath.  Suspicion for referred pain from cardiac or pulmonary cause is extremely low.  I doubt septic arthritis or inflammatory effusion such as gout or pseudogout given absence of effusion, joint redness, afebrile, normal active and passive ROM.  No clinical evidence of overlying skin or soft tissue infection such as cellulitis or necrotizing soft tissue infection.    Recommended conservative treatment with NSAIDS, ice, rest, stretching and follow-up in ortho clinic if symptoms not improving. Short course muscle relaxers given. Discussed indications to return to ED if any new or worsening symptoms develop.        Disposition   The patient was discharged.     Diagnosis     ICD-10-CM    1. Strain of left shoulder, initial encounter  S46.912A            Discharge Medications   New Prescriptions    CYCLOBENZAPRINE (FLEXERIL) 10 MG TABLET    Take 1 tablet (10 mg) by mouth 2 times daily as needed for other or muscle spasms (Pain).              Blanco Gordillo PA-C  02/11/25 1753

## 2025-05-06 ENCOUNTER — OFFICE VISIT (OUTPATIENT)
Dept: URGENT CARE | Facility: URGENT CARE | Age: 25
End: 2025-05-06
Payer: COMMERCIAL

## 2025-05-06 VITALS
RESPIRATION RATE: 18 BRPM | DIASTOLIC BLOOD PRESSURE: 77 MMHG | WEIGHT: 223 LBS | HEART RATE: 83 BPM | TEMPERATURE: 98.6 F | HEIGHT: 66 IN | SYSTOLIC BLOOD PRESSURE: 119 MMHG | OXYGEN SATURATION: 97 % | BODY MASS INDEX: 35.84 KG/M2

## 2025-05-06 DIAGNOSIS — R10.2 PELVIC PAIN IN FEMALE: ICD-10-CM

## 2025-05-06 DIAGNOSIS — N89.8 VAGINAL DISCHARGE: ICD-10-CM

## 2025-05-06 DIAGNOSIS — A59.01 TRICHOMONAS VAGINITIS: Primary | ICD-10-CM

## 2025-05-06 LAB
ALBUMIN UR-MCNC: NEGATIVE MG/DL
APPEARANCE UR: CLEAR
BACTERIA #/AREA URNS HPF: ABNORMAL /HPF
BILIRUB UR QL STRIP: NEGATIVE
CLUE CELLS: ABNORMAL
COLOR UR AUTO: YELLOW
GLUCOSE UR STRIP-MCNC: NEGATIVE MG/DL
HCG UR QL: NEGATIVE
HGB UR QL STRIP: ABNORMAL
KETONES UR STRIP-MCNC: NEGATIVE MG/DL
LEUKOCYTE ESTERASE UR QL STRIP: NEGATIVE
NITRATE UR QL: NEGATIVE
PH UR STRIP: 5.5 [PH] (ref 5–7)
RBC #/AREA URNS AUTO: ABNORMAL /HPF
SP GR UR STRIP: >=1.03 (ref 1–1.03)
SQUAMOUS #/AREA URNS AUTO: ABNORMAL /LPF
TRICHOMONAS, WET PREP: PRESENT
UROBILINOGEN UR STRIP-ACNC: 0.2 E.U./DL
WBC #/AREA URNS AUTO: ABNORMAL /HPF
WBC'S/HIGH POWER FIELD, WET PREP: ABNORMAL
YEAST, WET PREP: ABNORMAL

## 2025-05-06 PROCEDURE — 87210 SMEAR WET MOUNT SALINE/INK: CPT | Performed by: PHYSICIAN ASSISTANT

## 2025-05-06 PROCEDURE — 81001 URINALYSIS AUTO W/SCOPE: CPT | Performed by: PHYSICIAN ASSISTANT

## 2025-05-06 PROCEDURE — 3078F DIAST BP <80 MM HG: CPT | Performed by: PHYSICIAN ASSISTANT

## 2025-05-06 PROCEDURE — 87591 N.GONORRHOEAE DNA AMP PROB: CPT | Performed by: PHYSICIAN ASSISTANT

## 2025-05-06 PROCEDURE — 99214 OFFICE O/P EST MOD 30 MIN: CPT | Performed by: PHYSICIAN ASSISTANT

## 2025-05-06 PROCEDURE — 87491 CHLMYD TRACH DNA AMP PROBE: CPT | Performed by: PHYSICIAN ASSISTANT

## 2025-05-06 PROCEDURE — 3074F SYST BP LT 130 MM HG: CPT | Performed by: PHYSICIAN ASSISTANT

## 2025-05-06 PROCEDURE — 81025 URINE PREGNANCY TEST: CPT | Performed by: PHYSICIAN ASSISTANT

## 2025-05-06 RX ORDER — METRONIDAZOLE 500 MG/1
500 TABLET ORAL 2 TIMES DAILY
Qty: 14 TABLET | Refills: 0 | Status: SHIPPED | OUTPATIENT
Start: 2025-05-06 | End: 2025-05-13

## 2025-05-06 NOTE — PATIENT INSTRUCTIONS
May 6, 2025  Urgent Care Plan.        1. Start the Flagyl (also called Metronidazole) medication I prescribed to treat your Trichomoniasis (sexually transmitted infection)     2. Do not have any sexual activity until you and any of your partner(s) have treated for Trichomoniasis, until your other STI test results are back, and until your symptoms are all gone.       3. Follow up with your primary care team if your symptoms are not all gone in one week, or sooner  if your symptoms worsen (such as pelvic pain,fever, chills, back pain, abdominal pain, nausea, vomiting or any new symptoms)

## 2025-05-06 NOTE — PROGRESS NOTES
Urgent Care Clinic Visit    Chief Complaint   Patient presents with    Vaginal Problem     C/o vaginal burning, feels sore, lower abdominal pain, unable to urinate and vaginal discharge x2 days               5/6/2025     1:27 PM   Additional Questions   Roomed by Mi         5/6/2025   Forms   Any forms needing to be completed Yes     Pre-Provider Visit Orders- Urinalysis UA/UC  Patient reports the following symptoms:  discomfort, pain or burning with urination   Does the patient report any of the following symptoms: vaginal discharge, vaginal itching, possible yeast infection, has a urinary catheter in place, or unable to void in a specimen cup?  No      Pre-Provider Visit Orders - Vaginal Infection  Reason for visit:  Possible vaginal infections

## 2025-05-06 NOTE — PROGRESS NOTES
ASSESSMENT/PLAN:    (A59.01) Trichomonas vaginitis  (primary encounter diagnosis)    MDM: Acute vaginal discharge and irritation. Atypical end stream dysuria. No severe pelvic pain, abdominal pain, fever. No known STI exposure. Trichomonas positive. Gonorrheae and Chlamydia test results are pending. STI prevention is reviewed. Plan as per below (which I reviewed with patient verbally and [facility])     Plan: metroNIDAZOLE (FLAGYL) 500 MG tablet            May 6, 2025  Urgent Care Plan.        1. Start the Flagyl (also called Metronidazole) medication I prescribed to treat your Trichomoniasis (sexually transmitted infection)     2. Do not have any sexual activity until you and any of your partner(s) have treated for Trichomoniasis, until your other STI test results are back, and until your symptoms are all gone.       3. Follow up with your primary care team if your symptoms are not all gone in one week, or sooner  if your symptoms worsen (such as pelvic pain,fever, chills, back pain, abdominal pain, nausea, vomiting or any new symptoms)        (N89.8) Vaginal discharge  Plan: Wet prep - Clinic Collect, UA with Microscopic         reflex to Culture, Chlamydia & Gonorrhea by         PCR, GICH/Range - Clinic Collect, UA         Microscopic with Reflex to Culture            (R10.2) Pelvic pain in female  Plan: HCG Qual, Urine ([id number])            This progress note has been dictated, with use of voice recognition software. Any grammatical, typographical, or context errors are unintentional and inherent to use of voice recognition software.  ----------------          Chief Complaint   Patient presents with    Vaginal Problem     C/o vaginal burning, feels sore, lower abdominal pain, unable to urinate and vaginal discharge x2 days       Laura RAN Barrera presents to urgent care today for evaluation of vaginal discharge, vaginal discomfort (described as burning), atypical dysuria (described as some  discomfort at end of urinary stream) and some pelvic cramping for 2 days duration. Patient is also requesting pregnancy screening/states she has had 5 days of brown vaginal spotting (which is reportedly not common for her until she is just about due for her Depo injection)         GYN: Possible she may have had one pregnancy with miscarriage/never medically diagnosed. Single. Sexually active. Male partner. No use of condoms. Partner is without STI symptoms. Positive for vaginal bleeding and dark brown blood spotting for the past 5 days. States she is current on Depo injections and typically doesn't spot until just before she is due for Depo.                Patient Active Problem List   Diagnosis    Migraine without aura and without status migrainosus, not intractable     Current Outpatient Medications   Medication Sig Dispense Refill    medroxyPROGESTERone (DEPO-PROVERA) 150 MG/ML IM injection Inject 150 mg into the muscle      albuterol (PROAIR HFA/PROVENTIL HFA/VENTOLIN HFA) 108 (90 Base) MCG/ACT inhaler Inhale 1-2 puffs into the lungs every 4 hours as needed for shortness of breath or cough (Patient not taking: Reported on 5/6/2025) 8.5 g 1     No current facility-administered medications for this visit.           Allergies   Allergen Reactions    Diazepam     Seasonal Allergies           ROS:      CONSITUTIONAL: No sudden onset fever, chills or acute onset weakness  CARDIAC: No sudden onset chest pain or shortness of breath   RESP: No sudden onset cough, chest pain or shortness of breath   GI: No abdominal pain. No nausea, vomiting or diarrhea. No acute flank pain.   URINARY: Positive for end stream dysuria. Patient states she has a good/normal urine flow and states she is urinating at least 4 times per day/reports normal urine output.   RHEUM: No sudden onset hot, red, warm joints       OBJECTIVE:  /77 (BP Location: Right arm, Patient Position: Sitting, Cuff Size: Adult Large)   Pulse 83   Temp 98.6  F  "(37  C) (Tympanic)   Resp 18   Ht 1.676 m (5' 6\")   Wt 101.2 kg (223 lb)   LMP 04/24/2025 (Exact Date)   SpO2 97%   BMI 35.99 kg/m                 GENERAL:  Very pleasant, comfortable and generally well appearing.  SKIN: No rashes.  Normal color.  Sclera clear.  CARDIAC:NORMAL - regular rate and rhythm without murmur., normal s1/s2 and without extra heart sounds  RESP: Normal - CTA without rales, rhonchi, or wheezing.  ABDOMEN:  Soft, non-tender, non-distended.  Positive normal bowel sounds.  No HSM or masses. No Pelvic pain on external palpation. No CVA tenderness.  NEURO: Alert and oriented.  Normal speech and mentation.  CN II/XII grossly intact.  Gait within normal limits.      LABS:     Results for orders placed or performed in visit on 05/06/25   UA with Microscopic reflex to Culture     Status: Abnormal    Specimen: Urine, Clean Catch   Result Value Ref Range    Color Urine Yellow Colorless, Straw, Light Yellow, Yellow    Appearance Urine Clear Clear    Glucose Urine Negative Negative mg/dL    Bilirubin Urine Negative Negative    Ketones Urine Negative Negative mg/dL    Specific Gravity Urine >=1.030 1.003 - 1.035    Blood Urine Small (A) Negative    pH Urine 5.5 5.0 - 7.0    Protein Albumin Urine Negative Negative mg/dL    Urobilinogen Urine 0.2 0.2, 1.0 E.U./dL    Nitrite Urine Negative Negative    Leukocyte Esterase Urine Negative Negative   HCG Qual, Urine ([id number])     Status: Normal   Result Value Ref Range    hCG Urine Qualitative Negative Negative   UA Microscopic with Reflex to Culture     Status: Abnormal   Result Value Ref Range    Bacteria Urine Few (A) None Seen /HPF    RBC Urine 10-25 (A) 0-2 /HPF /HPF    WBC Urine 0-5 0-5 /HPF /HPF    Squamous Epithelials Urine Few (A) None Seen /LPF    Narrative    Urine Culture not indicated   Wet prep - Clinic Collect     Status: Abnormal    Specimen: Vagina; Swab   Result Value Ref Range    Trichomonas Present (A) Absent    Yeast Absent Absent    Clue " Cells Absent Absent    WBCs/high power field 2+ (A) None

## 2025-05-06 NOTE — LETTER
2025    Laura Barrera   2000        To Whom it May Concern;    Please excuse Laura Barrera from missed work today for a healthcare visit on May 6, 2025.    Sincerely,        Michel Joaquin PA-C

## 2025-05-07 LAB
C TRACH DNA SPEC QL PROBE+SIG AMP: NEGATIVE
N GONORRHOEA DNA SPEC QL NAA+PROBE: NEGATIVE
SPECIMEN TYPE: NORMAL

## 2025-07-19 ENCOUNTER — HEALTH MAINTENANCE LETTER (OUTPATIENT)
Age: 25
End: 2025-07-19

## 2025-07-30 ENCOUNTER — APPOINTMENT (OUTPATIENT)
Dept: GENERAL RADIOLOGY | Facility: CLINIC | Age: 25
End: 2025-07-30
Attending: PHYSICIAN ASSISTANT
Payer: COMMERCIAL

## 2025-07-30 ENCOUNTER — HOSPITAL ENCOUNTER (EMERGENCY)
Facility: CLINIC | Age: 25
Discharge: HOME | End: 2025-07-30
Attending: PHYSICIAN ASSISTANT
Payer: COMMERCIAL

## 2025-07-30 VITALS
OXYGEN SATURATION: 99 % | WEIGHT: 226.63 LBS | RESPIRATION RATE: 18 BRPM | SYSTOLIC BLOOD PRESSURE: 131 MMHG | BODY MASS INDEX: 36.58 KG/M2 | HEART RATE: 113 BPM | DIASTOLIC BLOOD PRESSURE: 81 MMHG | TEMPERATURE: 97.2 F

## 2025-07-30 DIAGNOSIS — S20.229A CONTUSION OF UNSPECIFIED BACK WALL OF THORAX, INITIAL ENCOUNTER: ICD-10-CM
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
## 2025-07-30 DIAGNOSIS — S16.1XXA STRAIN OF NECK MUSCLE, INITIAL ENCOUNTER: ICD-10-CM

## 2025-07-30 DIAGNOSIS — S30.0XXA CONTUSION OF LOWER BACK, INITIAL ENCOUNTER: ICD-10-CM

## 2025-07-30 LAB — HCG UR QL: NEGATIVE

## 2025-07-30 PROCEDURE — 72220 X-RAY EXAM SACRUM TAILBONE: CPT

## 2025-07-30 PROCEDURE — 81025 URINE PREGNANCY TEST: CPT | Performed by: PHYSICIAN ASSISTANT

## 2025-07-30 PROCEDURE — 72100 X-RAY EXAM L-S SPINE 2/3 VWS: CPT

## 2025-07-30 PROCEDURE — 71046 X-RAY EXAM CHEST 2 VIEWS: CPT

## 2025-07-30 PROCEDURE — 99284 EMERGENCY DEPT VISIT MOD MDM: CPT | Mod: 25 | Performed by: PHYSICIAN ASSISTANT

## 2025-07-30 RX ORDER — CYCLOBENZAPRINE HCL 10 MG
10 TABLET ORAL 3 TIMES DAILY PRN
Qty: 15 TABLET | Refills: 0 | Status: SHIPPED | OUTPATIENT
Start: 2025-07-30 | End: 2025-08-05

## 2025-07-30 ASSESSMENT — ACTIVITIES OF DAILY LIVING (ADL)
ADLS_ACUITY_SCORE: 41

## 2025-07-30 ASSESSMENT — COLUMBIA-SUICIDE SEVERITY RATING SCALE - C-SSRS
2. HAVE YOU ACTUALLY HAD ANY THOUGHTS OF KILLING YOURSELF IN THE PAST MONTH?: NO
6. HAVE YOU EVER DONE ANYTHING, STARTED TO DO ANYTHING, OR PREPARED TO DO ANYTHING TO END YOUR LIFE?: NO
1. IN THE PAST MONTH, HAVE YOU WISHED YOU WERE DEAD OR WISHED YOU COULD GO TO SLEEP AND NOT WAKE UP?: NO

## 2025-07-30 NOTE — ED TRIAGE NOTES
Missed step and slipped down stairs. Patient reports injury to lower back. No loss of bowel or bladder. No saddle anesthesia. Denies hitting head. No thinners.

## 2025-07-30 NOTE — ED PROVIDER NOTES
Emergency Department Note      History of Present Illness     Chief Complaint   Fall and Back Pain      HPI   Laura Barrera is a 25 year old female who presents to the ED for evaluation of a fall and back pain. Patient reports she fell on butt sliding down 6 hardwood stairs in her apartment about 6 days ago. Her leg hurt immediately after but has since resolved. Does still have some low back pain as well as posterior rib pain and neck pain..No headache, chest or abdominal pain.  No numbness or tingling in extremities. She denies vomiting, loss of consciousness, incontinence, or saddle anesthesia. She is not on thinners. She has been taking ibuprofen for the pain.       Independent Historian   None    Review of External Notes   None    Past Medical History     Medical History and Problem List   Migraine   Anxiety and depression   Scoliosis       Medications   The patient is not currently taking any prescribed medications.     Surgical History   Cholecystostomy   Spine surgery for scoliosis     Physical Exam     Patient Vitals for the past 24 hrs:   BP Temp Temp src Pulse Resp SpO2 Weight   07/30/25 1957 131/81 -- -- -- 18 99 % --   07/30/25 1741 (!) 144/99 -- -- -- -- -- --   07/30/25 1740 -- 97.2  F (36.2  C) Temporal -- -- -- --   07/30/25 1739 -- -- -- 113 16 99 % 102.8 kg (226 lb 10.1 oz)     Physical Exam  General: Alert and awake.  Head:  Scalp is NC/AT without bruising, hematomas.  Eyes:  Globes normal and atraumatic.  PERRL, EOMI   ENT:  No bruising of facial bone ttp or step-offs.    TM's normal bilaterally    Oropharynx atraumatic.  Neck:  Normal range of motion without rigidity. Able to rotate 45 degrees BL. No bruising or swelling.  CV:  Regular rate and rhythm (92 on my exam). No M/R/G.  Resp:  Breath sounds are clear bilaterally. Normal effort.  Abdomen: Abdomen is soft, no distension, no tenderness, no masses.  MS:  Midline lumbar spine and tailbone ttp to multiple pts without stepoff.  No midline  thoracic or cervical ttp.    Ttp to Bl posterior ribs without bruising, crepitus or step-off.  No tenderness over sternum, scapula, clavicles.    Extremities atraumatic.  PROM of all other major joints performed and unremarkable.  Skin:  Warm and dry, No bruising.  Extremities warm and well perfused  Neuro:  Alert and oriented to self, place, time situation.  No facial asymmetry, tongue protrudes midline, speech not slurred..  5/5 strength BL to upper and lower extremities.   Normal sensation to light touch BL in extremities and face. GCS: 15. Gait normal.  Psych:  Alert.  Normal affect.  Cooperative.      Diagnostics     Lab Results   Labs Ordered and Resulted from Time of ED Arrival to Time of ED Departure   HCG QUALITATIVE URINE - Normal       Result Value    hCG Urine Qualitative Negative         Imaging   XR Sacrum and Coccyx 2 Views   Final Result   IMPRESSION: There is no radiographic evidence of an acute displaced sacrococcygeal fracture. SI joint spaces are normal. There is mild remodeling of the pubic symphysis which appears to be chronic.       Chest XR,  PA & LAT   Final Result   IMPRESSION: Postop change over the spine. Chest otherwise negative. Lungs are clear and expanded.      Lumbar spine XR, 2-3 views   Final Result   IMPRESSION: Posterior fusion instrumentation extends from the thoracic spine to the L1 level. Upper portion not included on this exam. No evidence for hardware fractures or loosening.      9 degree lower lumbar levoconvex scoliosis centered at L4-5.      Normal vertebral body heights.      Normal disc spaces and facet joints. Negative for bone fracture.          Independent Interpretation   CXR: No pneumothorax, infiltrate, pleural effusion, visible rib fracture, cardiomegaly, or mediastinal widening.    ED Course      Medications Administered   Medications - No data to display    Procedures   None     Discussion of Management   None    ED Course   ED Course as of 07/30/25 2037   Wed  Jul 30, 2025 1752 I obtained history and examined the patient as noted above.    1954 I rechecked the patient and explained findings.        Additional Documentation  None    Medical Decision Making / Diagnosis     CMS Diagnoses: None    MIPS   None               MDM   25 year old female who presents after mechanical fall w/neck posterior rib and low back/tailbone pain.  Chest xray shows no evidence of acute traumatic abnormality.  X-ray of the lumbar spine suboptimal but no evidence of fracture or height loss and sacral x-ray negative.  Neurologic exam is normal there is no indication for MRI.  Exam otherwise reassuring very low suspicion for more serious occult solid organ vascular retroperitoneal or vertebral injury and do not believe advanced imaging with MRI or CT warranted at this magali to the torso.  By [facility] CT criteria, patient falls into a very low risk category for serious intracranial injury. No indication for C-spine imaging by [facility] C-spine rule. Head to toe trauma exam otherwise not suggestive of serious injury and do not feel additional imaging warranted given low likelihood of serious injury.    My impression is contusion, sprain.  Discussed conservative treatment with rest, ice, NSAIDS, gentle stretching.  Red flag symptoms for more serious injury discussed and the need to return for additional imaging and workup should these develop was discussed.      Disposition   The patient was discharged.     Diagnosis     ICD-10-CM    1. Fall, initial encounter  W19.XXXA       2. Contusion of lower back, initial encounter  S30.0XXA       3. Strain of neck muscle, initial encounter  S16.1XXA       4. Contusion of unspecified back wall of thorax, initial encounter  S20.229A            Discharge Medications   Discharge Medication List as of 7/30/2025  8:06 PM        START taking these medications    Details   cyclobenzaprine (FLEXERIL) 10 MG tablet Take 1 tablet (10 mg) by mouth 3 times daily as needed  for other (pain)., Disp-15 tablet, R-0, E-Prescribe               Scribe Disclosure:  I, David Waldrop, am serving as a scribe at 5:48 PM on 7/30/2025 to document services personally performed by Blanco Gordillo PA-C based on my observations and the provider's statements to me.        Blanco Gordillo PA-C  07/30/25 2039

## 2025-08-08 ENCOUNTER — HOSPITAL ENCOUNTER (EMERGENCY)
Facility: CLINIC | Age: 25
Discharge: HOME | End: 2025-08-08
Attending: EMERGENCY MEDICINE | Admitting: EMERGENCY MEDICINE
Payer: COMMERCIAL

## 2025-08-08 ENCOUNTER — APPOINTMENT (OUTPATIENT)
Dept: CT IMAGING | Facility: CLINIC | Age: 25
End: 2025-08-08
Attending: EMERGENCY MEDICINE
Payer: COMMERCIAL

## 2025-08-08 VITALS
OXYGEN SATURATION: 98 % | BODY MASS INDEX: 36.12 KG/M2 | DIASTOLIC BLOOD PRESSURE: 72 MMHG | HEART RATE: 88 BPM | RESPIRATION RATE: 18 BRPM | TEMPERATURE: 98.9 F | SYSTOLIC BLOOD PRESSURE: 125 MMHG | WEIGHT: 223.77 LBS

## 2025-08-08 DIAGNOSIS — K76.0 HEPATIC STEATOSIS: Primary | ICD-10-CM

## 2025-08-08 LAB
ALBUMIN SERPL BCG-MCNC: 4.7 G/DL (ref 3.5–5.2)
ALBUMIN UR-MCNC: NEGATIVE MG/DL
ALP SERPL-CCNC: 202 U/L (ref 40–150)
ALT SERPL W P-5'-P-CCNC: 189 U/L (ref 0–50)
ANION GAP SERPL CALCULATED.3IONS-SCNC: 15 MMOL/L (ref 7–15)
APPEARANCE UR: CLEAR
AST SERPL W P-5'-P-CCNC: 96 U/L (ref 0–45)
BASOPHILS # BLD AUTO: 0 10E3/UL (ref 0–0.2)
BASOPHILS NFR BLD AUTO: 0 %
BILIRUB SERPL-MCNC: 0.8 MG/DL
BILIRUB UR QL STRIP: NEGATIVE
BUN SERPL-MCNC: 8.8 MG/DL (ref 6–20)
CALCIUM SERPL-MCNC: 9.6 MG/DL (ref 8.8–10.4)
CHLORIDE SERPL-SCNC: 102 MMOL/L (ref 98–107)
COLOR UR AUTO: YELLOW
CREAT SERPL-MCNC: 0.53 MG/DL (ref 0.51–0.95)
EGFRCR SERPLBLD CKD-EPI 2021: >90 ML/MIN/1.73M2
EOSINOPHIL # BLD AUTO: 0 10E3/UL (ref 0–0.7)
EOSINOPHIL NFR BLD AUTO: 0 %
ERYTHROCYTE [DISTWIDTH] IN BLOOD BY AUTOMATED COUNT: 12 % (ref 10–15)
GLUCOSE SERPL-MCNC: 103 MG/DL (ref 70–99)
GLUCOSE UR STRIP-MCNC: NEGATIVE MG/DL
HCG UR QL: NEGATIVE
HCO3 SERPL-SCNC: 19 MMOL/L (ref 22–29)
HCT VFR BLD AUTO: 42.9 % (ref 35–47)
HGB BLD-MCNC: 15 G/DL (ref 11.7–15.7)
HGB UR QL STRIP: ABNORMAL
IMM GRANULOCYTES # BLD: 0 10E3/UL
IMM GRANULOCYTES NFR BLD: 1 %
KETONES UR STRIP-MCNC: 80 MG/DL
LEUKOCYTE ESTERASE UR QL STRIP: NEGATIVE
LIPASE SERPL-CCNC: 26 U/L (ref 13–60)
LYMPHOCYTES # BLD AUTO: 0.5 10E3/UL (ref 0.8–5.3)
LYMPHOCYTES NFR BLD AUTO: 8 %
MCH RBC QN AUTO: 28.8 PG (ref 26.5–33)
MCHC RBC AUTO-ENTMCNC: 35 G/DL (ref 31.5–36.5)
MCV RBC AUTO: 82 FL (ref 78–100)
MONOCYTES # BLD AUTO: 0.5 10E3/UL (ref 0–1.3)
MONOCYTES NFR BLD AUTO: 8 %
MUCOUS THREADS #/AREA URNS LPF: PRESENT /LPF
NEUTROPHILS # BLD AUTO: 5.7 10E3/UL (ref 1.6–8.3)
NEUTROPHILS NFR BLD AUTO: 84 %
NITRATE UR QL: NEGATIVE
NRBC # BLD AUTO: 0 10E3/UL
NRBC BLD AUTO-RTO: 0 /100
PH UR STRIP: 5.5 [PH] (ref 5–7)
PLATELET # BLD AUTO: 281 10E3/UL (ref 150–450)
POTASSIUM SERPL-SCNC: 3.6 MMOL/L (ref 3.4–5.3)
PROT SERPL-MCNC: 7.6 G/DL (ref 6.4–8.3)
RBC # BLD AUTO: 5.21 10E6/UL (ref 3.8–5.2)
RBC URINE: >182 /HPF
SODIUM SERPL-SCNC: 136 MMOL/L (ref 135–145)
SP GR UR STRIP: 1.03 (ref 1–1.03)
SQUAMOUS EPITHELIAL: 1 /HPF
UROBILINOGEN UR STRIP-MCNC: NORMAL MG/DL
WBC # BLD AUTO: 6.8 10E3/UL (ref 4–11)
WBC URINE: 2 /HPF

## 2025-08-08 PROCEDURE — 96375 TX/PRO/DX INJ NEW DRUG ADDON: CPT

## 2025-08-08 PROCEDURE — 96361 HYDRATE IV INFUSION ADD-ON: CPT

## 2025-08-08 PROCEDURE — 85041 AUTOMATED RBC COUNT: CPT | Performed by: EMERGENCY MEDICINE

## 2025-08-08 PROCEDURE — 250N000011 HC RX IP 250 OP 636: Performed by: EMERGENCY MEDICINE

## 2025-08-08 PROCEDURE — 250N000009 HC RX 250: Performed by: EMERGENCY MEDICINE

## 2025-08-08 PROCEDURE — 81003 URINALYSIS AUTO W/O SCOPE: CPT | Performed by: EMERGENCY MEDICINE

## 2025-08-08 PROCEDURE — 99285 EMERGENCY DEPT VISIT HI MDM: CPT | Mod: 25 | Performed by: EMERGENCY MEDICINE

## 2025-08-08 PROCEDURE — 81025 URINE PREGNANCY TEST: CPT | Performed by: EMERGENCY MEDICINE

## 2025-08-08 PROCEDURE — 96374 THER/PROPH/DIAG INJ IV PUSH: CPT | Mod: 59

## 2025-08-08 PROCEDURE — 83690 ASSAY OF LIPASE: CPT | Performed by: EMERGENCY MEDICINE

## 2025-08-08 PROCEDURE — 74177 CT ABD & PELVIS W/CONTRAST: CPT

## 2025-08-08 PROCEDURE — 82565 ASSAY OF CREATININE: CPT | Performed by: EMERGENCY MEDICINE

## 2025-08-08 PROCEDURE — 36415 COLL VENOUS BLD VENIPUNCTURE: CPT | Performed by: EMERGENCY MEDICINE

## 2025-08-08 PROCEDURE — 258N000003 HC RX IP 258 OP 636: Performed by: EMERGENCY MEDICINE

## 2025-08-08 RX ORDER — ONDANSETRON 2 MG/ML
4 INJECTION INTRAMUSCULAR; INTRAVENOUS EVERY 30 MIN PRN
Status: DISCONTINUED | OUTPATIENT
Start: 2025-08-08 | End: 2025-08-08 | Stop reason: HOSPADM

## 2025-08-08 RX ORDER — KETOROLAC TROMETHAMINE 15 MG/ML
15 INJECTION, SOLUTION INTRAMUSCULAR; INTRAVENOUS ONCE
Status: COMPLETED | OUTPATIENT
Start: 2025-08-08 | End: 2025-08-08

## 2025-08-08 RX ORDER — ONDANSETRON 4 MG/1
4 TABLET, ORALLY DISINTEGRATING ORAL EVERY 8 HOURS PRN
Qty: 10 TABLET | Refills: 0 | Status: SHIPPED | OUTPATIENT
Start: 2025-08-08 | End: 2025-08-11

## 2025-08-08 RX ORDER — IOPAMIDOL 755 MG/ML
500 INJECTION, SOLUTION INTRAVASCULAR ONCE
Status: COMPLETED | OUTPATIENT
Start: 2025-08-08 | End: 2025-08-08

## 2025-08-08 RX ADMIN — SODIUM CHLORIDE 1000 ML: 0.9 INJECTION, SOLUTION INTRAVENOUS at 06:25

## 2025-08-08 RX ADMIN — IOPAMIDOL 100 ML: 755 INJECTION, SOLUTION INTRAVENOUS at 09:43

## 2025-08-08 RX ADMIN — KETOROLAC TROMETHAMINE 15 MG: 15 INJECTION, SOLUTION INTRAMUSCULAR; INTRAVENOUS at 10:33

## 2025-08-08 RX ADMIN — SODIUM CHLORIDE 65 ML: 9 INJECTION, SOLUTION INTRAVENOUS at 09:43

## 2025-08-08 RX ADMIN — ONDANSETRON 4 MG: 2 INJECTION, SOLUTION INTRAMUSCULAR; INTRAVENOUS at 06:49

## 2025-08-08 ASSESSMENT — ACTIVITIES OF DAILY LIVING (ADL)
ADLS_ACUITY_SCORE: 41

## 2025-08-08 ASSESSMENT — COLUMBIA-SUICIDE SEVERITY RATING SCALE - C-SSRS
1. IN THE PAST MONTH, HAVE YOU WISHED YOU WERE DEAD OR WISHED YOU COULD GO TO SLEEP AND NOT WAKE UP?: NO
2. HAVE YOU ACTUALLY HAD ANY THOUGHTS OF KILLING YOURSELF IN THE PAST MONTH?: NO
6. HAVE YOU EVER DONE ANYTHING, STARTED TO DO ANYTHING, OR PREPARED TO DO ANYTHING TO END YOUR LIFE?: NO

## 2025-09-04 ENCOUNTER — OFFICE VISIT (OUTPATIENT)
Dept: URGENT CARE | Facility: URGENT CARE | Age: 25
End: 2025-09-04
Payer: COMMERCIAL

## 2025-09-04 VITALS
BODY MASS INDEX: 36.4 KG/M2 | WEIGHT: 225.5 LBS | RESPIRATION RATE: 16 BRPM | TEMPERATURE: 98.5 F | HEART RATE: 89 BPM | OXYGEN SATURATION: 96 % | SYSTOLIC BLOOD PRESSURE: 128 MMHG | DIASTOLIC BLOOD PRESSURE: 87 MMHG

## 2025-09-04 DIAGNOSIS — R35.0 URINE FREQUENCY: Primary | ICD-10-CM

## 2025-09-04 LAB
ALBUMIN UR-MCNC: NEGATIVE MG/DL
APPEARANCE UR: CLEAR
BACTERIA #/AREA URNS HPF: ABNORMAL /HPF
BILIRUB UR QL STRIP: NEGATIVE
COLOR UR AUTO: YELLOW
GLUCOSE UR STRIP-MCNC: NEGATIVE MG/DL
HCG UR QL: NEGATIVE
HGB UR QL STRIP: ABNORMAL
KETONES UR STRIP-MCNC: NEGATIVE MG/DL
LEUKOCYTE ESTERASE UR QL STRIP: NEGATIVE
NITRATE UR QL: NEGATIVE
PH UR STRIP: 7 [PH] (ref 5–7)
RBC #/AREA URNS AUTO: ABNORMAL /HPF
SP GR UR STRIP: 1.01 (ref 1–1.03)
SQUAMOUS #/AREA URNS AUTO: ABNORMAL /LPF
UROBILINOGEN UR STRIP-ACNC: 0.2 E.U./DL
WBC #/AREA URNS AUTO: ABNORMAL /HPF